# Patient Record
Sex: MALE | Race: WHITE | NOT HISPANIC OR LATINO
[De-identification: names, ages, dates, MRNs, and addresses within clinical notes are randomized per-mention and may not be internally consistent; named-entity substitution may affect disease eponyms.]

---

## 2018-12-11 PROBLEM — Z00.00 ENCOUNTER FOR PREVENTIVE HEALTH EXAMINATION: Status: ACTIVE | Noted: 2018-12-11

## 2018-12-13 ENCOUNTER — TRANSCRIPTION ENCOUNTER (OUTPATIENT)
Age: 38
End: 2018-12-13

## 2018-12-13 ENCOUNTER — APPOINTMENT (OUTPATIENT)
Dept: UROLOGY | Facility: CLINIC | Age: 38
End: 2018-12-13
Payer: COMMERCIAL

## 2018-12-13 ENCOUNTER — EMERGENCY (EMERGENCY)
Facility: HOSPITAL | Age: 38
LOS: 1 days | Discharge: HOME | End: 2018-12-13
Attending: EMERGENCY MEDICINE | Admitting: EMERGENCY MEDICINE
Payer: COMMERCIAL

## 2018-12-13 ENCOUNTER — INPATIENT (INPATIENT)
Facility: HOSPITAL | Age: 38
LOS: 6 days | Discharge: HOME | End: 2018-12-20
Attending: SPECIALIST | Admitting: SPECIALIST
Payer: COMMERCIAL

## 2018-12-13 VITALS — TEMPERATURE: 103 F

## 2018-12-13 VITALS
HEIGHT: 71.5 IN | SYSTOLIC BLOOD PRESSURE: 148 MMHG | WEIGHT: 306 LBS | TEMPERATURE: 99.5 F | OXYGEN SATURATION: 92 % | HEART RATE: 106 BPM | DIASTOLIC BLOOD PRESSURE: 81 MMHG | BODY MASS INDEX: 41.9 KG/M2

## 2018-12-13 VITALS
DIASTOLIC BLOOD PRESSURE: 76 MMHG | TEMPERATURE: 99 F | RESPIRATION RATE: 18 BRPM | SYSTOLIC BLOOD PRESSURE: 147 MMHG | OXYGEN SATURATION: 100 % | HEART RATE: 102 BPM

## 2018-12-13 VITALS
OXYGEN SATURATION: 97 % | WEIGHT: 302.92 LBS | HEART RATE: 92 BPM | SYSTOLIC BLOOD PRESSURE: 145 MMHG | TEMPERATURE: 99 F | DIASTOLIC BLOOD PRESSURE: 84 MMHG | RESPIRATION RATE: 20 BRPM | HEIGHT: 71 IN

## 2018-12-13 VITALS
SYSTOLIC BLOOD PRESSURE: 125 MMHG | HEART RATE: 112 BPM | OXYGEN SATURATION: 92 % | TEMPERATURE: 98.6 F | DIASTOLIC BLOOD PRESSURE: 82 MMHG

## 2018-12-13 DIAGNOSIS — N50.819 TESTICULAR PAIN, UNSPECIFIED: ICD-10-CM

## 2018-12-13 DIAGNOSIS — M54.9 DORSALGIA, UNSPECIFIED: ICD-10-CM

## 2018-12-13 DIAGNOSIS — Z98.890 OTHER SPECIFIED POSTPROCEDURAL STATES: Chronic | ICD-10-CM

## 2018-12-13 DIAGNOSIS — N45.3 EPIDIDYMO-ORCHITIS: ICD-10-CM

## 2018-12-13 DIAGNOSIS — N45.2 ORCHITIS: ICD-10-CM

## 2018-12-13 LAB
ALBUMIN SERPL ELPH-MCNC: 4.3 G/DL — SIGNIFICANT CHANGE UP (ref 3.3–5)
ALP SERPL-CCNC: 72 U/L — SIGNIFICANT CHANGE UP (ref 40–120)
ALT FLD-CCNC: 27 U/L — SIGNIFICANT CHANGE UP (ref 4–41)
APPEARANCE UR: CLEAR — SIGNIFICANT CHANGE UP
APTT BLD: 31.5 SEC — SIGNIFICANT CHANGE UP (ref 27.5–36.3)
AST SERPL-CCNC: 16 U/L — SIGNIFICANT CHANGE UP (ref 4–40)
BACTERIA # UR AUTO: NEGATIVE — SIGNIFICANT CHANGE UP
BASE EXCESS BLDV CALC-SCNC: 3.4 MMOL/L — SIGNIFICANT CHANGE UP
BASOPHILS # BLD AUTO: 0.07 K/UL — SIGNIFICANT CHANGE UP (ref 0–0.2)
BASOPHILS NFR BLD AUTO: 0.3 % — SIGNIFICANT CHANGE UP (ref 0–2)
BASOPHILS NFR SPEC: 0 % — SIGNIFICANT CHANGE UP (ref 0–2)
BILIRUB SERPL-MCNC: 0.3 MG/DL — SIGNIFICANT CHANGE UP (ref 0.2–1.2)
BILIRUB UR-MCNC: NEGATIVE — SIGNIFICANT CHANGE UP
BLASTS # FLD: 0 % — SIGNIFICANT CHANGE UP (ref 0–0)
BLOOD GAS VENOUS - CREATININE: 1.06 MG/DL — SIGNIFICANT CHANGE UP (ref 0.5–1.3)
BLOOD UR QL VISUAL: NEGATIVE — SIGNIFICANT CHANGE UP
BUN SERPL-MCNC: 11 MG/DL — SIGNIFICANT CHANGE UP (ref 7–23)
CALCIUM SERPL-MCNC: 9.2 MG/DL — SIGNIFICANT CHANGE UP (ref 8.4–10.5)
CHLORIDE BLDV-SCNC: 104 MMOL/L — SIGNIFICANT CHANGE UP (ref 96–108)
CHLORIDE SERPL-SCNC: 100 MMOL/L — SIGNIFICANT CHANGE UP (ref 98–107)
CO2 SERPL-SCNC: 26 MMOL/L — SIGNIFICANT CHANGE UP (ref 22–31)
COLOR SPEC: YELLOW — SIGNIFICANT CHANGE UP
CREAT SERPL-MCNC: 1.13 MG/DL — SIGNIFICANT CHANGE UP (ref 0.5–1.3)
EOSINOPHIL # BLD AUTO: 0.03 K/UL — SIGNIFICANT CHANGE UP (ref 0–0.5)
EOSINOPHIL NFR BLD AUTO: 0.1 % — SIGNIFICANT CHANGE UP (ref 0–6)
EOSINOPHIL NFR FLD: 0 % — SIGNIFICANT CHANGE UP (ref 0–6)
GAS PNL BLDV: 137 MMOL/L — SIGNIFICANT CHANGE UP (ref 136–146)
GLUCOSE BLDV-MCNC: 139 — HIGH (ref 70–99)
GLUCOSE SERPL-MCNC: 130 MG/DL — HIGH (ref 70–99)
GLUCOSE UR-MCNC: NEGATIVE — SIGNIFICANT CHANGE UP
HCO3 BLDV-SCNC: 26 MMOL/L — SIGNIFICANT CHANGE UP (ref 20–27)
HCT VFR BLD CALC: 45.9 % — SIGNIFICANT CHANGE UP (ref 39–50)
HCT VFR BLDV CALC: 47.2 % — SIGNIFICANT CHANGE UP (ref 39–51)
HGB BLD-MCNC: 15 G/DL — SIGNIFICANT CHANGE UP (ref 13–17)
HGB BLDV-MCNC: 15.4 G/DL — SIGNIFICANT CHANGE UP (ref 13–17)
HYALINE CASTS # UR AUTO: NEGATIVE — SIGNIFICANT CHANGE UP
IMM GRANULOCYTES # BLD AUTO: 0.16 # — SIGNIFICANT CHANGE UP
IMM GRANULOCYTES NFR BLD AUTO: 0.6 % — SIGNIFICANT CHANGE UP (ref 0–1.5)
INR BLD: 1.11 — SIGNIFICANT CHANGE UP (ref 0.88–1.17)
KETONES UR-MCNC: NEGATIVE — SIGNIFICANT CHANGE UP
LACTATE BLDV-MCNC: 1.5 MMOL/L — SIGNIFICANT CHANGE UP (ref 0.5–2)
LEUKOCYTE ESTERASE UR-ACNC: NEGATIVE — SIGNIFICANT CHANGE UP
LYMPHOCYTES # BLD AUTO: 1.55 K/UL — SIGNIFICANT CHANGE UP (ref 1–3.3)
LYMPHOCYTES # BLD AUTO: 5.9 % — LOW (ref 13–44)
LYMPHOCYTES NFR SPEC AUTO: 6.1 % — LOW (ref 13–44)
MANUAL SMEAR VERIFICATION: SIGNIFICANT CHANGE UP
MCHC RBC-ENTMCNC: 29.4 PG — SIGNIFICANT CHANGE UP (ref 27–34)
MCHC RBC-ENTMCNC: 32.7 % — SIGNIFICANT CHANGE UP (ref 32–36)
MCV RBC AUTO: 90 FL — SIGNIFICANT CHANGE UP (ref 80–100)
METAMYELOCYTES # FLD: 0 % — SIGNIFICANT CHANGE UP (ref 0–1)
MONOCYTES # BLD AUTO: 1.45 K/UL — HIGH (ref 0–0.9)
MONOCYTES NFR BLD AUTO: 5.6 % — SIGNIFICANT CHANGE UP (ref 2–14)
MONOCYTES NFR BLD: 3.5 % — SIGNIFICANT CHANGE UP (ref 2–9)
MORPHOLOGY BLD-IMP: NORMAL — SIGNIFICANT CHANGE UP
MYELOCYTES NFR BLD: 0 % — SIGNIFICANT CHANGE UP (ref 0–0)
NEUTROPHIL AB SER-ACNC: 85.2 % — HIGH (ref 43–77)
NEUTROPHILS # BLD AUTO: 22.8 K/UL — HIGH (ref 1.8–7.4)
NEUTROPHILS NFR BLD AUTO: 87.5 % — HIGH (ref 43–77)
NEUTS BAND # BLD: 5.2 % — SIGNIFICANT CHANGE UP (ref 0–6)
NITRITE UR-MCNC: NEGATIVE — SIGNIFICANT CHANGE UP
NRBC # FLD: 0 — SIGNIFICANT CHANGE UP
OTHER - HEMATOLOGY %: 0 — SIGNIFICANT CHANGE UP
PCO2 BLDV: 44 MMHG — SIGNIFICANT CHANGE UP (ref 41–51)
PH BLDV: 7.42 PH — SIGNIFICANT CHANGE UP (ref 7.32–7.43)
PH UR: 6 — SIGNIFICANT CHANGE UP (ref 5–8)
PLATELET # BLD AUTO: 356 K/UL — SIGNIFICANT CHANGE UP (ref 150–400)
PLATELET COUNT - ESTIMATE: NORMAL — SIGNIFICANT CHANGE UP
PMV BLD: 10 FL — SIGNIFICANT CHANGE UP (ref 7–13)
PO2 BLDV: 30 MMHG — LOW (ref 35–40)
POTASSIUM BLDV-SCNC: 3.9 MMOL/L — SIGNIFICANT CHANGE UP (ref 3.4–4.5)
POTASSIUM SERPL-MCNC: 4 MMOL/L — SIGNIFICANT CHANGE UP (ref 3.5–5.3)
POTASSIUM SERPL-SCNC: 4 MMOL/L — SIGNIFICANT CHANGE UP (ref 3.5–5.3)
PROMYELOCYTES # FLD: 0 % — SIGNIFICANT CHANGE UP (ref 0–0)
PROT SERPL-MCNC: 7.9 G/DL — SIGNIFICANT CHANGE UP (ref 6–8.3)
PROT UR-MCNC: 20 — SIGNIFICANT CHANGE UP
PROTHROM AB SERPL-ACNC: 12.7 SEC — SIGNIFICANT CHANGE UP (ref 9.8–13.1)
RBC # BLD: 5.1 M/UL — SIGNIFICANT CHANGE UP (ref 4.2–5.8)
RBC # FLD: 13 % — SIGNIFICANT CHANGE UP (ref 10.3–14.5)
RBC CASTS # UR COMP ASSIST: SIGNIFICANT CHANGE UP (ref 0–?)
REVIEW TO FOLLOW: YES — SIGNIFICANT CHANGE UP
SAO2 % BLDV: 55.9 % — LOW (ref 60–85)
SODIUM SERPL-SCNC: 137 MMOL/L — SIGNIFICANT CHANGE UP (ref 135–145)
SP GR SPEC: 1.03 — SIGNIFICANT CHANGE UP (ref 1–1.04)
SQUAMOUS # UR AUTO: SIGNIFICANT CHANGE UP
UROBILINOGEN FLD QL: NORMAL — SIGNIFICANT CHANGE UP
VARIANT LYMPHS # BLD: 0 % — SIGNIFICANT CHANGE UP
WBC # BLD: 26.06 K/UL — HIGH (ref 3.8–10.5)
WBC # FLD AUTO: 26.06 K/UL — HIGH (ref 3.8–10.5)
WBC UR QL: SIGNIFICANT CHANGE UP (ref 0–?)

## 2018-12-13 PROCEDURE — 99203 OFFICE O/P NEW LOW 30 MIN: CPT

## 2018-12-13 PROCEDURE — 99285 EMERGENCY DEPT VISIT HI MDM: CPT | Mod: 25

## 2018-12-13 PROCEDURE — 99223 1ST HOSP IP/OBS HIGH 75: CPT

## 2018-12-13 PROCEDURE — 76870 US EXAM SCROTUM: CPT | Mod: 26

## 2018-12-13 RX ORDER — SENNA PLUS 8.6 MG/1
2 TABLET ORAL AT BEDTIME
Qty: 0 | Refills: 0 | Status: DISCONTINUED | OUTPATIENT
Start: 2018-12-13 | End: 2018-12-20

## 2018-12-13 RX ORDER — CEFTRIAXONE 500 MG/1
250 INJECTION, POWDER, FOR SOLUTION INTRAMUSCULAR; INTRAVENOUS ONCE
Qty: 0 | Refills: 0 | Status: COMPLETED | OUTPATIENT
Start: 2018-12-13 | End: 2018-12-13

## 2018-12-13 RX ORDER — HEPARIN SODIUM 5000 [USP'U]/ML
5000 INJECTION INTRAVENOUS; SUBCUTANEOUS EVERY 12 HOURS
Qty: 0 | Refills: 0 | Status: DISCONTINUED | OUTPATIENT
Start: 2018-12-13 | End: 2018-12-20

## 2018-12-13 RX ORDER — ONDANSETRON 8 MG/1
4 TABLET, FILM COATED ORAL EVERY 6 HOURS
Qty: 0 | Refills: 0 | Status: DISCONTINUED | OUTPATIENT
Start: 2018-12-13 | End: 2018-12-20

## 2018-12-13 RX ORDER — CEFTRIAXONE 500 MG/1
1 INJECTION, POWDER, FOR SOLUTION INTRAMUSCULAR; INTRAVENOUS EVERY 24 HOURS
Qty: 0 | Refills: 0 | Status: DISCONTINUED | OUTPATIENT
Start: 2018-12-14 | End: 2018-12-14

## 2018-12-13 RX ORDER — OXYCODONE AND ACETAMINOPHEN 5; 325 MG/1; MG/1
1 TABLET ORAL ONCE
Qty: 0 | Refills: 0 | Status: DISCONTINUED | OUTPATIENT
Start: 2018-12-13 | End: 2018-12-13

## 2018-12-13 RX ORDER — MORPHINE SULFATE 50 MG/1
6 CAPSULE, EXTENDED RELEASE ORAL ONCE
Qty: 0 | Refills: 0 | Status: DISCONTINUED | OUTPATIENT
Start: 2018-12-13 | End: 2018-12-13

## 2018-12-13 RX ORDER — OXYCODONE HYDROCHLORIDE 5 MG/1
15 TABLET ORAL EVERY 6 HOURS
Qty: 0 | Refills: 0 | Status: DISCONTINUED | OUTPATIENT
Start: 2018-12-13 | End: 2018-12-19

## 2018-12-13 RX ORDER — SODIUM CHLORIDE 9 MG/ML
1000 INJECTION INTRAMUSCULAR; INTRAVENOUS; SUBCUTANEOUS ONCE
Qty: 0 | Refills: 0 | Status: COMPLETED | OUTPATIENT
Start: 2018-12-13 | End: 2018-12-13

## 2018-12-13 RX ORDER — MORPHINE SULFATE 50 MG/1
4 CAPSULE, EXTENDED RELEASE ORAL
Qty: 0 | Refills: 0 | Status: DISCONTINUED | OUTPATIENT
Start: 2018-12-13 | End: 2018-12-15

## 2018-12-13 RX ORDER — MORPHINE SULFATE 50 MG/1
4 CAPSULE, EXTENDED RELEASE ORAL ONCE
Qty: 0 | Refills: 0 | Status: DISCONTINUED | OUTPATIENT
Start: 2018-12-13 | End: 2018-12-13

## 2018-12-13 RX ORDER — SODIUM CHLORIDE 9 MG/ML
2000 INJECTION, SOLUTION INTRAVENOUS ONCE
Qty: 0 | Refills: 0 | Status: COMPLETED | OUTPATIENT
Start: 2018-12-13 | End: 2018-12-13

## 2018-12-13 RX ORDER — ACETAMINOPHEN 500 MG
1000 TABLET ORAL ONCE
Qty: 0 | Refills: 0 | Status: COMPLETED | OUTPATIENT
Start: 2018-12-14 | End: 2018-12-14

## 2018-12-13 RX ORDER — DOCUSATE SODIUM 100 MG
100 CAPSULE ORAL THREE TIMES A DAY
Qty: 0 | Refills: 0 | Status: DISCONTINUED | OUTPATIENT
Start: 2018-12-13 | End: 2018-12-20

## 2018-12-13 RX ORDER — ACETAMINOPHEN 500 MG
650 TABLET ORAL ONCE
Qty: 0 | Refills: 0 | Status: COMPLETED | OUTPATIENT
Start: 2018-12-13 | End: 2018-12-13

## 2018-12-13 RX ORDER — SODIUM CHLORIDE 9 MG/ML
1000 INJECTION, SOLUTION INTRAVENOUS
Qty: 0 | Refills: 0 | Status: DISCONTINUED | OUTPATIENT
Start: 2018-12-13 | End: 2018-12-17

## 2018-12-13 RX ORDER — ACETAMINOPHEN 500 MG
1000 TABLET ORAL ONCE
Qty: 0 | Refills: 0 | Status: COMPLETED | OUTPATIENT
Start: 2018-12-13 | End: 2018-12-13

## 2018-12-13 RX ORDER — ACETAMINOPHEN 500 MG
1000 TABLET ORAL ONCE
Qty: 0 | Refills: 0 | Status: COMPLETED | OUTPATIENT
Start: 2018-12-13 | End: 2018-12-14

## 2018-12-13 RX ADMIN — Medication 650 MILLIGRAM(S): at 08:47

## 2018-12-13 RX ADMIN — MORPHINE SULFATE 6 MILLIGRAM(S): 50 CAPSULE, EXTENDED RELEASE ORAL at 08:47

## 2018-12-13 RX ADMIN — OXYCODONE AND ACETAMINOPHEN 1 TABLET(S): 5; 325 TABLET ORAL at 08:46

## 2018-12-13 RX ADMIN — MORPHINE SULFATE 6 MILLIGRAM(S): 50 CAPSULE, EXTENDED RELEASE ORAL at 08:46

## 2018-12-13 RX ADMIN — SODIUM CHLORIDE 1000 MILLILITER(S): 9 INJECTION INTRAMUSCULAR; INTRAVENOUS; SUBCUTANEOUS at 08:46

## 2018-12-13 RX ADMIN — Medication 400 MILLIGRAM(S): at 15:01

## 2018-12-13 RX ADMIN — MORPHINE SULFATE 4 MILLIGRAM(S): 50 CAPSULE, EXTENDED RELEASE ORAL at 09:58

## 2018-12-13 RX ADMIN — Medication 1000 MILLIGRAM(S): at 15:35

## 2018-12-13 RX ADMIN — OXYCODONE HYDROCHLORIDE 15 MILLIGRAM(S): 5 TABLET ORAL at 15:35

## 2018-12-13 RX ADMIN — Medication 100 MILLIGRAM(S): at 10:05

## 2018-12-13 RX ADMIN — CEFTRIAXONE 250 MILLIGRAM(S): 500 INJECTION, POWDER, FOR SOLUTION INTRAMUSCULAR; INTRAVENOUS at 10:27

## 2018-12-13 RX ADMIN — Medication 650 MILLIGRAM(S): at 08:46

## 2018-12-13 RX ADMIN — Medication 100 MILLIGRAM(S): at 17:50

## 2018-12-13 RX ADMIN — OXYCODONE HYDROCHLORIDE 15 MILLIGRAM(S): 5 TABLET ORAL at 15:01

## 2018-12-13 RX ADMIN — HEPARIN SODIUM 5000 UNIT(S): 5000 INJECTION INTRAVENOUS; SUBCUTANEOUS at 17:50

## 2018-12-13 RX ADMIN — OXYCODONE AND ACETAMINOPHEN 1 TABLET(S): 5; 325 TABLET ORAL at 07:34

## 2018-12-13 NOTE — ED PROVIDER NOTE - ATTENDING CONTRIBUTION TO CARE
HPI: 39 yo M with no past medical history presents with R testicle pain and swelling since sitting on R testicle 8d ago and feeling sudden pain.  Swelling and tenderness were improving but tonight got worse. Has had no sexual activity or new trauma since but has masterbated once since incident/trauma. dull achy Pain radiates to back and across lower abd, no masses seen.  Initially had gross hematuria, since resolved.  Also complaining of nausea, no back pain. Has appointment with urology today but decided to come to ED for eval.  Denies h/o sti, dysuria, penile discharge, fever.  Pain does not improve with elevation or cryotherapy.  EXAM: Inguinal region without masses/hernia, scrotum with severe right testicular pain with palpation, left normal, penis without bleeding or discharge at meatus. +cremasteric reflex bilaterally.   MDM: concern for hydrocele vs varicocele vs fluid build up in scrotum from trauma leading to pain. Also consider STI. Will obtain US, UA, provide pain meds, ice pack and reassess. also consider torsion but unlikely since + cremasteric reflex and has been ongoing x 6 days.

## 2018-12-13 NOTE — ED ADULT NURSE REASSESSMENT NOTE - NS ED NURSE REASSESS COMMENT FT1
Patient cleared for discharge by provider.  D/C instructions and heplock removed by provider. Chalino YEE

## 2018-12-13 NOTE — ED ADULT TRIAGE NOTE - CHIEF COMPLAINT QUOTE
On Wednesday, patient sat on right testicle, he has noticed increased swelling and pain.  Patient has been experiencing nausea, chills, lower back/abdomen pain, right leg and groin pain.  Had hematuria on Wednesday but now resolved, denies urinary sx at this time.  Difficult to ambulate due to pain.

## 2018-12-13 NOTE — ED PROVIDER NOTE - OBJECTIVE STATEMENT
38M with no past medical history R testicle pain and swelling since sitting on R testicle 8d ago and feeling sudden pain.  Swelling and tenderness are getting worse.  Pain radiates to back.  Initially had gross hematuria, since resolved.  Also complaining of nausea, no back pain. Has appointment with urology today.  Denies h/o sti, dysuria, penile discharge, fever.  Pain does not improve with elevation or cryotherapy.

## 2018-12-13 NOTE — PATIENT PROFILE ADULT - STATED REASON FOR ADMISSION
"I was sent from the doctors office bc my white count was high. I had an injury to my right scrotum on last Wednesday"

## 2018-12-13 NOTE — ED PROVIDER NOTE - PROGRESS NOTE DETAILS
Patient febrile to ~102, will draw labs including cultures, give ivf. [facility].  No torsion.  In setting of fever, will give ceftriaxone and doxycycline, will send home to fu with his urology appointment.

## 2018-12-13 NOTE — ED PROVIDER NOTE - CARE PLAN
Principal Discharge DX:	Scrotal pain Principal Discharge DX:	Scrotal pain  Assessment and plan of treatment:	1) You were here for testicle pain.  You were found with epididymal orchitis of your left testicle.   2) Take your medicine as prescribed.   3) Follow up with your urology appointment today for further evaluation and to answer any questions you have.    4) Return to the emergency department if you experience worsening symptoms, pain, fever, chills, nausea, vomiting or other concerning symptoms.

## 2018-12-13 NOTE — ED PROVIDER NOTE - MEDICAL DECISION MAKING DETAILS
38M here with traumatic R testicle pain after sitting on it.  Concern for torsion vs. traumatic hydrocele/hematoma vs. rupture.  Testicle u/s, pain meds, ua, gc chlam.

## 2018-12-13 NOTE — H&P ADULT - HISTORY OF PRESENT ILLNESS
Pt relates he accidentally sat on his scrotum 1 week ago and has been having pain and progressive swelling in his R hemiscrotum since.    He has been taking Ibuprofen 1200 mg a day to help with pain and swelling.  He made an appointment with a Urologist for today.  However, he developed lower abdominal and back pain and went to [facility] ER for evaluation.  There he was found to have a fever of 102.7, Leukocytopenia (26k) and an US c/w epidimo-orchitis. In ER  blood /  urine cultures and GC Chlamydia  were sent.  He was given Ceftriaxone and Doxycyline.   Pt was d/c 'ed and went to his Urologist appointment with Dr. Tucker.  After evaluation he was sent to [facility] for direct admission.   Denies any N/V.

## 2018-12-13 NOTE — H&P ADULT - NSHPLABSRESULTS_GEN_ALL_CORE
15.0   26.06 )-----------( 356      ( 13 Dec 2018 08:50 )             45.9         137  |  100  |  11  ----------------------------<  130<H>  4.0   |  26  |  1.13    Ca    9.2      13 Dec 2018 08:50    TPro  7.9  /  Alb  4.3  /  TBili  0.3  /  DBili  x   /  AST  16  /  ALT  27  /  AlkPhos  72  12-    Urinalysis Basic - ( 13 Dec 2018 08:51 )    Color: YELLOW / Appearance: CLEAR / S.030 / pH: 6.0  Gluc: NEGATIVE / Ketone: NEGATIVE  / Bili: NEGATIVE / Urobili: NORMAL   Blood: NEGATIVE / Protein: 20 / Nitrite: NEGATIVE   Leuk Esterase: NEGATIVE / RBC: 0-2 / WBC 3-5   Sq Epi: OCC / Non Sq Epi: x / Bacteria: NEGATIVE

## 2018-12-13 NOTE — ED PROVIDER NOTE - NSFOLLOWUPINSTRUCTIONS_ED_ALL_ED_FT
1) You were here for testicle pain.  You were found with epididymal orchitis of your left testicle.   2) Take your medicine as prescribed.   3) Follow up with your urology appointment today for further evaluation and to answer any questions you have.    4) Return to the emergency department if you experience worsening symptoms, pain, fever, chills, nausea, vomiting or other concerning symptoms.

## 2018-12-13 NOTE — ED PROVIDER NOTE - PHYSICAL EXAMINATION
***GEN - well appearing; NAD   ***HEAD - NC/AT  ***EYES/NOSE - PEERL, EOMI, mucous membranes moist, no discharge   ***THROAT: Oral cavity and pharynx normal. No inflammation, swelling, exudate, or lesions.    ***NECK: supple, non-tender no lymphadenopathy  ***PULMONARY - CTA b/l, symmetric breath sounds.   ***CARDIAC- s1s2, RRR, no murmur  ***ABDOMEN - +BS, ND, NT, soft, no guarding, no rebound, no organomegaly  ***BACK - no CVA tenderness, Normal  spine, no spinal TTP  ***EXTREMITIES - symmetric pulses, 2+ dp, capillary refill < 2 seconds, no clubbing, no cyanosis, no edema   ***SKIN - warm, dry, intact, no rash or bruising   ***NEUROLOGIC - a&o x3, CN 3-12 intact, sensation nl, motor 5/5 RUE/LUE/RLE/LLE  *** - R testicle hard, uniformly swollen, with diffuse tenderness to palpation, cremasteric absent on R, present on L.  Normal color and lie.  No penile discharge, no lesions.

## 2018-12-13 NOTE — ED ADULT NURSE NOTE - OBJECTIVE STATEMENT
PT brought into room 5. A&OX4 ambulatory self care male presents to the ED today after sitting on his testicles 12/5. Over time patient states the right one has become swollen and extremely painful." PT states the pain radiates to his abdomin and back. As well as a fever with chills and nausea. MD at bedside, pain worsening when touching, patient has bilateral sensation down his legs. Denies STD/ STI. Pt states he has not had sexual intercourse since event due to pain. Awaiting further orders.

## 2018-12-13 NOTE — ED ADULT NURSE REASSESSMENT NOTE - NS ED NURSE REASSESS COMMENT FT1
Received patient report at 08:30 a.m., patient labs drawn this A.M., with pain medication ordered by provider and administered.  Patint family at the bedside, plan is for sono and transported at this time for testing.  Chalino YEE

## 2018-12-13 NOTE — H&P ADULT - NSHPPHYSICALEXAM_GEN_ALL_CORE
PHYSICAL EXAM:      Constitutional: Awake alert NAD    Eyes: Conjunctiva clear    ENMT: Mouth Moist; No nasal discharge    Neck: supple , No JVD    Breasts: not examined    Back:  CVAT  [  ]Y   [ x ]N    Respiratory: No Resp Distress    Cardiovascular: Reg Rate and Rhythm    Gastrointestinal: Soft, NT, ND, No guarding    Genitourinary: Penis WNL ,  Scrotum + erythema, swelling, and tenderness and swelling of R hemiscrotum and R testicle and epididymis    Extremities: AROM x 4    Vascular: Radial Pulse Reg Rate & Rhythm    Neurological: No focal Defecits, A &O x3    Skin: No rash    Lymph Nodes: No Lymphadenopathy    Musculoskeletal: Strength Intact    Psychiatric: Cooperative, No  Psychosis No Agression

## 2018-12-13 NOTE — ED ADULT NURSE NOTE - NSIMPLEMENTINTERV_GEN_ALL_ED
Implemented All Universal Safety Interventions:  [place] to call system. Call bell, personal items and telephone within reach. Instruct patient to call for assistance. Room bathroom lighting operational. Non-slip footwear when patient is off stretcher. Physically safe environment: no spills, clutter or unnecessary equipment. Stretcher in lowest position, wheels locked, appropriate side rails in place.

## 2018-12-14 DIAGNOSIS — A41.9 SEPSIS, UNSPECIFIED ORGANISM: ICD-10-CM

## 2018-12-14 LAB
BACTERIA UR CULT: SIGNIFICANT CHANGE UP
BASOPHILS # BLD AUTO: 0.11 K/UL — SIGNIFICANT CHANGE UP (ref 0–0.2)
BASOPHILS NFR BLD AUTO: 0.3 % — SIGNIFICANT CHANGE UP (ref 0–2)
BUN SERPL-MCNC: 10 MG/DL — SIGNIFICANT CHANGE UP (ref 7–23)
C TRACH RRNA SPEC QL NAA+PROBE: SIGNIFICANT CHANGE UP
CALCIUM SERPL-MCNC: 9 MG/DL — SIGNIFICANT CHANGE UP (ref 8.4–10.5)
CHLORIDE SERPL-SCNC: 95 MMOL/L — LOW (ref 98–107)
CO2 SERPL-SCNC: 21 MMOL/L — LOW (ref 22–31)
CREAT SERPL-MCNC: 1 MG/DL — SIGNIFICANT CHANGE UP (ref 0.5–1.3)
EOSINOPHIL # BLD AUTO: 0.03 K/UL — SIGNIFICANT CHANGE UP (ref 0–0.5)
EOSINOPHIL NFR BLD AUTO: 0.1 % — SIGNIFICANT CHANGE UP (ref 0–6)
GLUCOSE SERPL-MCNC: 105 MG/DL — HIGH (ref 70–99)
HCT VFR BLD CALC: 43.3 % — SIGNIFICANT CHANGE UP (ref 39–50)
HGB BLD-MCNC: 14 G/DL — SIGNIFICANT CHANGE UP (ref 13–17)
IMM GRANULOCYTES # BLD AUTO: 0.54 # — SIGNIFICANT CHANGE UP
IMM GRANULOCYTES NFR BLD AUTO: 1.3 % — SIGNIFICANT CHANGE UP (ref 0–1.5)
LYMPHOCYTES # BLD AUTO: 2.89 K/UL — SIGNIFICANT CHANGE UP (ref 1–3.3)
LYMPHOCYTES # BLD AUTO: 7.1 % — LOW (ref 13–44)
MANUAL SMEAR VERIFICATION: SIGNIFICANT CHANGE UP
MCHC RBC-ENTMCNC: 29.3 PG — SIGNIFICANT CHANGE UP (ref 27–34)
MCHC RBC-ENTMCNC: 32.3 % — SIGNIFICANT CHANGE UP (ref 32–36)
MCV RBC AUTO: 90.6 FL — SIGNIFICANT CHANGE UP (ref 80–100)
MONOCYTES # BLD AUTO: 3.11 K/UL — HIGH (ref 0–0.9)
MONOCYTES NFR BLD AUTO: 7.6 % — SIGNIFICANT CHANGE UP (ref 2–14)
N GONORRHOEA RRNA SPEC QL NAA+PROBE: SIGNIFICANT CHANGE UP
NEUTROPHILS # BLD AUTO: 34.12 K/UL — HIGH (ref 1.8–7.4)
NEUTROPHILS NFR BLD AUTO: 83.6 % — HIGH (ref 43–77)
NRBC # FLD: 0 — SIGNIFICANT CHANGE UP
PLATELET # BLD AUTO: 344 K/UL — SIGNIFICANT CHANGE UP (ref 150–400)
PMV BLD: 10.5 FL — SIGNIFICANT CHANGE UP (ref 7–13)
POTASSIUM SERPL-MCNC: 3.7 MMOL/L — SIGNIFICANT CHANGE UP (ref 3.5–5.3)
POTASSIUM SERPL-SCNC: 3.7 MMOL/L — SIGNIFICANT CHANGE UP (ref 3.5–5.3)
RBC # BLD: 4.78 M/UL — SIGNIFICANT CHANGE UP (ref 4.2–5.8)
RBC # FLD: 13.2 % — SIGNIFICANT CHANGE UP (ref 10.3–14.5)
SODIUM SERPL-SCNC: 133 MMOL/L — LOW (ref 135–145)
SPECIMEN SOURCE: SIGNIFICANT CHANGE UP
WBC # BLD: 40.8 K/UL — CRITICAL HIGH (ref 3.8–10.5)
WBC # FLD AUTO: 40.8 K/UL — CRITICAL HIGH (ref 3.8–10.5)

## 2018-12-14 PROCEDURE — 99222 1ST HOSP IP/OBS MODERATE 55: CPT

## 2018-12-14 PROCEDURE — 99232 SBSQ HOSP IP/OBS MODERATE 35: CPT

## 2018-12-14 PROCEDURE — 99223 1ST HOSP IP/OBS HIGH 75: CPT

## 2018-12-14 RX ORDER — PIPERACILLIN AND TAZOBACTAM 4; .5 G/20ML; G/20ML
3.38 INJECTION, POWDER, LYOPHILIZED, FOR SOLUTION INTRAVENOUS EVERY 8 HOURS
Qty: 0 | Refills: 0 | Status: DISCONTINUED | OUTPATIENT
Start: 2018-12-14 | End: 2018-12-20

## 2018-12-14 RX ORDER — VANCOMYCIN HCL 1 G
1250 VIAL (EA) INTRAVENOUS EVERY 12 HOURS
Qty: 0 | Refills: 0 | Status: DISCONTINUED | OUTPATIENT
Start: 2018-12-15 | End: 2018-12-16

## 2018-12-14 RX ORDER — VANCOMYCIN HCL 1 G
1250 VIAL (EA) INTRAVENOUS ONCE
Qty: 0 | Refills: 0 | Status: COMPLETED | OUTPATIENT
Start: 2018-12-14 | End: 2018-12-14

## 2018-12-14 RX ORDER — VANCOMYCIN HCL 1 G
VIAL (EA) INTRAVENOUS
Qty: 0 | Refills: 0 | Status: DISCONTINUED | OUTPATIENT
Start: 2018-12-14 | End: 2018-12-16

## 2018-12-14 RX ORDER — PIPERACILLIN AND TAZOBACTAM 4; .5 G/20ML; G/20ML
3.38 INJECTION, POWDER, LYOPHILIZED, FOR SOLUTION INTRAVENOUS ONCE
Qty: 0 | Refills: 0 | Status: COMPLETED | OUTPATIENT
Start: 2018-12-14 | End: 2018-12-14

## 2018-12-14 RX ORDER — ACETAMINOPHEN 500 MG
1000 TABLET ORAL ONCE
Qty: 0 | Refills: 0 | Status: COMPLETED | OUTPATIENT
Start: 2018-12-14 | End: 2018-12-14

## 2018-12-14 RX ORDER — IBUPROFEN 200 MG
400 TABLET ORAL ONCE
Qty: 0 | Refills: 0 | Status: COMPLETED | OUTPATIENT
Start: 2018-12-14 | End: 2018-12-14

## 2018-12-14 RX ADMIN — PIPERACILLIN AND TAZOBACTAM 25 GRAM(S): 4; .5 INJECTION, POWDER, LYOPHILIZED, FOR SOLUTION INTRAVENOUS at 22:19

## 2018-12-14 RX ADMIN — MORPHINE SULFATE 4 MILLIGRAM(S): 50 CAPSULE, EXTENDED RELEASE ORAL at 12:50

## 2018-12-14 RX ADMIN — MORPHINE SULFATE 4 MILLIGRAM(S): 50 CAPSULE, EXTENDED RELEASE ORAL at 05:51

## 2018-12-14 RX ADMIN — Medication 400 MILLIGRAM(S): at 00:43

## 2018-12-14 RX ADMIN — Medication 100 MILLIGRAM(S): at 12:50

## 2018-12-14 RX ADMIN — Medication 100 MILLIGRAM(S): at 05:37

## 2018-12-14 RX ADMIN — Medication 100 MILLIGRAM(S): at 05:36

## 2018-12-14 RX ADMIN — Medication 100 MILLIGRAM(S): at 22:19

## 2018-12-14 RX ADMIN — SODIUM CHLORIDE 50 MILLILITER(S): 9 INJECTION, SOLUTION INTRAVENOUS at 05:36

## 2018-12-14 RX ADMIN — Medication 400 MILLIGRAM(S): at 05:36

## 2018-12-14 RX ADMIN — Medication 400 MILLIGRAM(S): at 12:50

## 2018-12-14 RX ADMIN — SENNA PLUS 2 TABLET(S): 8.6 TABLET ORAL at 22:19

## 2018-12-14 RX ADMIN — HEPARIN SODIUM 5000 UNIT(S): 5000 INJECTION INTRAVENOUS; SUBCUTANEOUS at 05:36

## 2018-12-14 RX ADMIN — SODIUM CHLORIDE 50 MILLILITER(S): 9 INJECTION, SOLUTION INTRAVENOUS at 00:43

## 2018-12-14 RX ADMIN — HEPARIN SODIUM 5000 UNIT(S): 5000 INJECTION INTRAVENOUS; SUBCUTANEOUS at 18:02

## 2018-12-14 RX ADMIN — CEFTRIAXONE 100 GRAM(S): 500 INJECTION, POWDER, FOR SOLUTION INTRAMUSCULAR; INTRAVENOUS at 01:50

## 2018-12-14 RX ADMIN — SODIUM CHLORIDE 50 MILLILITER(S): 9 INJECTION, SOLUTION INTRAVENOUS at 22:18

## 2018-12-14 RX ADMIN — Medication 166.67 MILLIGRAM(S): at 18:02

## 2018-12-14 RX ADMIN — Medication 400 MILLIGRAM(S): at 19:00

## 2018-12-14 RX ADMIN — PIPERACILLIN AND TAZOBACTAM 200 GRAM(S): 4; .5 INJECTION, POWDER, LYOPHILIZED, FOR SOLUTION INTRAVENOUS at 15:01

## 2018-12-14 RX ADMIN — Medication 400 MILLIGRAM(S): at 22:55

## 2018-12-14 RX ADMIN — OXYCODONE HYDROCHLORIDE 15 MILLIGRAM(S): 5 TABLET ORAL at 17:30

## 2018-12-14 RX ADMIN — MORPHINE SULFATE 4 MILLIGRAM(S): 50 CAPSULE, EXTENDED RELEASE ORAL at 13:05

## 2018-12-14 RX ADMIN — MORPHINE SULFATE 4 MILLIGRAM(S): 50 CAPSULE, EXTENDED RELEASE ORAL at 05:36

## 2018-12-14 RX ADMIN — OXYCODONE HYDROCHLORIDE 15 MILLIGRAM(S): 5 TABLET ORAL at 16:46

## 2018-12-14 RX ADMIN — MORPHINE SULFATE 4 MILLIGRAM(S): 50 CAPSULE, EXTENDED RELEASE ORAL at 01:02

## 2018-12-14 RX ADMIN — MORPHINE SULFATE 4 MILLIGRAM(S): 50 CAPSULE, EXTENDED RELEASE ORAL at 00:47

## 2018-12-14 NOTE — PROVIDER CONTACT NOTE (CRITICAL VALUE NOTIFICATION) - ASSESSMENT
Pt's recent vitals are t 98/3, HR 95, /68, RR 18 on 97% RA. Pt spiked a temperature 12/13 at 2300 of 100.9. Urology PA Nina Cervantes aware. IV tylenol was given and pt came down to 98. Denies SOB or chest pain. Pain is being managed w/ ordered pain meds.

## 2018-12-14 NOTE — CONSULT NOTE ADULT - PROBLEM SELECTOR RECOMMENDATION 2
Fever, leukocytosis, tachycardic on presentation.  UA neg, CXR neg, GC/CT neg.   f/u Ucx, Bcx  c/w IVF.  given neg GC/CT, would broaden coverage with Aztreonam in setting of unclear PCN allergy.   ID consult, f/u recs

## 2018-12-14 NOTE — PROGRESS NOTE ADULT - SUBJECTIVE AND OBJECTIVE BOX
Overnight events:  Remained afebrile overnight, PVR 30    Subjective:  Pt feels a little better, no voiding difficulties    Objective:    Vital signs  T(C): , Max: 39.3 (12-13-18 @ 08:15)  HR: 95 (12-14-18 @ 05:30)  BP: 149/68 (12-14-18 @ 05:30)  SpO2: 97% (12-14-18 @ 05:30)  Wt(kg): --    Output   Void: 400 + not saved, PVR 30    Gen: NAD  Abd: soft, nontender  : erythema/edema of penile shaft/scrotum, no crepitus, b/l testicles/epididymi tender, swollen right greater than left    Labs                        14.0   40.80 )-----------( 344      ( 14 Dec 2018 05:50 )             43.3     14 Dec 2018 05:50    133    |  95     |  10     ----------------------------<  105    3.7     |  21     |  1.00     Ca    9.0        14 Dec 2018 05:50        Urine Cx: P    Blood Cx: P    GC/chlamydia negative    Imaging:  Ultrasound:  Bilateral epididymoorchitis.

## 2018-12-14 NOTE — CONSULT NOTE ADULT - ATTENDING COMMENTS
Noreen Chavez MD  Pager: [phone]  After 5 PM or weekends please call fellow on call or office [phone]

## 2018-12-14 NOTE — CONSULT NOTE ADULT - PROBLEM SELECTOR RECOMMENDATION 9
US+, UA neg, all cx neg as now  Abx per ID rec.   given worsening leukocytosis with bandemia, would broaden GN coverage.

## 2018-12-14 NOTE — CONSULT NOTE ADULT - SUBJECTIVE AND OBJECTIVE BOX
Patient is a 38y old  Male who presents with a chief complaint of Epididimo-orchitis (14 Dec 2018 12:11)      HPI:  38M with no PMHx presented with worsening testicular pain and swelling x 1 week. Patient reports he had "sat on testicle" on Wednesday, resulted in acute pain at the time, which settled by Thursday. On Friday, patient noticed pain returning and worsening since. Patient initially had appointment with outpatient urologist but came to ED due to worsening pain and swelling. Patient was discharged from ED after given IV abx, but send back form urology office for direct admission due to sever symptoms. Patient reports the swelling and pain had been progressive worsening over the past few days. Denied any fever, chills, nausea, vomiting, or abdominal pain at home. Patient found to have fever, wbc and tachycardic in ED. UA neg, US scrotum showing b/l epididymoorchitis. Patient admit to urology service for management. started on Ceftriaxone/doxy. GC/CT neg.  scrotal pain, now stable with IV morphine.     Allergies    penicillin (Unknown reactions)      HOME MEDICATIONS: [ ] Reviewed  Home Medications:      MEDICATIONS  (STANDING):  cefTRIAXone   IVPB 1 Gram(s) IV Intermittent every 24 hours  docusate sodium 100 milliGRAM(s) Oral three times a day  doxycycline hyclate Capsule 100 milliGRAM(s) Oral every 12 hours  heparin  Injectable 5000 Unit(s) SubCutaneous every 12 hours  lactated ringers. 1000 milliLiter(s) (50 mL/Hr) IV Continuous <Continuous>  senna 2 Tablet(s) Oral at bedtime    MEDICATIONS  (PRN):  morphine  - Injectable 4 milliGRAM(s) IV Push every 3 hours PRN Severe Pain (7 - 10)  ondansetron Injectable 4 milliGRAM(s) IV Push every 6 hours PRN Nausea and/or Vomiting  oxyCODONE    IR 15 milliGRAM(s) Oral every 6 hours PRN Moderate Pain (4 - 6)      PAST MEDICAL & SURGICAL HISTORY:  No pertinent past medical history  H/O arthroscopic knee surgery: in high school    SOCIAL HISTORY:  Residence: [ ] [facility]  [ ] [facility]  [ ] Community  denied tobacco   denied etoh  denied illicit drug use     FAMILY HISTORY:  No pertinent family history in first degree relatives  [ ] No pertinent family history in first degree relatives     REVIEW OF SYSTEMS:    CONSTITUTIONAL: No fever; No chills; No night sweats; No weight loss; No fatigue  EYES: No eye pain; No blurry vision  ENMT:  No difficulty hearing; No sinus or throat pain  NECK: No pain or stiffness  RESPIRATORY: No cough; No wheezing; No hemoptysis; No shortness of breath; No sputum production  CARDIOVASCULAR: No chest pain; No palpitations; No leg swelling  GASTROINTESTINAL: No abdominal; No nausea; No vomiting; No hematemesis; No diarrhea; No constipation. No melena or hematochezia.  GENITOURINARY: +scrotal pain and swelling; No dysuria; No frequency; No hematuria; No incontinence  NEUROLOGICAL: No headaches; No loss of strength; No numbness  SKIN: No itching/burning; No rashes or lesions   MUSCULOSKELETAL: No joint pain or swelling; No muscle, back, or extremity pain  HEME/LYMPH: No easy bruising, or bleeding gums     Vital Signs Last 24 Hrs  T(C): 38.8 (14 Dec 2018 12:45), Max: 38.8 (14 Dec 2018 12:45)  T(F): 101.9 (14 Dec 2018 12:45), Max: 101.9 (14 Dec 2018 12:45)  HR: 97 (14 Dec 2018 12:45) (81 - 97)  BP: 143/66 (14 Dec 2018 12:45) (135/74 - 149/68)  BP(mean): --  RR: 17 (14 Dec 2018 12:45) (17 - 18)  SpO2: 96% (14 Dec 2018 12:45) (93% - 97%)    PHYSICAL EXAM:    GENERAL: no apparent distress  HEAD:  Atraumatic, Normocephalic  EYES: EOMI, PERRLA, conjunctiva and sclera clear b/l  CHEST/LUNG: on RA; Clear to auscultation bilaterally; No wheezing; No crackles  HEART: Regular rate and rhythm; S1/S2 wnl; no obvious murmurs  ABDOMEN: Soft, Nontender, Nondistended; Bowel sounds present  : localized scrotal edema with erythema. tender to palpation.   EXTREMITIES:  2+ Peripheral Pulses, No edema  PSYCH: normal affect, calm demeanor  NEUROLOGY: AAOX3, CN 2-12 grossly intact, no obvious FND     LABS:                        14.0   40.80 )-----------( 344      ( 14 Dec 2018 05:50 )             43.3     12-    133<L>  |  95<L>  |  10  ----------------------------<  105<H>  3.7   |  21<L>  |  1.00    Ca    9.0      14 Dec 2018 05:50    TPro  7.9  /  Alb  4.3  /  TBili  0.3  /  DBili  x   /  AST  16  /  ALT  27  /  AlkPhos  72  12-13    PT/INR - ( 13 Dec 2018 08:50 )   PT: 12.7 SEC;   INR: 1.11          PTT - ( 13 Dec 2018 08:50 )  PTT:31.5 SEC  Urinalysis Basic - ( 13 Dec 2018 08:51 )    Color: YELLOW / Appearance: CLEAR / S.030 / pH: 6.0  Gluc: NEGATIVE / Ketone: NEGATIVE  / Bili: NEGATIVE / Urobili: NORMAL   Blood: NEGATIVE / Protein: 20 / Nitrite: NEGATIVE   Leuk Esterase: NEGATIVE / RBC: 0-2 / WBC 3-5   Sq Epi: OCC / Non Sq Epi: x / Bacteria: NEGATIVE      CAPILLARY BLOOD GLUCOSE          RADIOLOGY & ADDITIONAL STUDIES:    EKG:   Personally Reviewed:  [ ] YES     Imaging:   Personally Reviewed:  [ ] YES               Consultant(s) notes reviewed:  Urology  Care Discussed with Consultant(s)/Other Providers:

## 2018-12-14 NOTE — CONSULT NOTE ADULT - SUBJECTIVE AND OBJECTIVE BOX
HPI:    38 year old male with no PMH who presents with right scrotal swelling and pain. Patient notes that he accidently sat on his scrotum ~1 week prior to admission and has been having pain and progressive swelling in his R hemiscrotum since.  He has been taking Ibuprofen 1200 mg a day to help with pain and swelling. He made an appointment with a Urologist on day of admission but developed lower abdominal and back pain and went to [facility] for evaluation.    On presentation febrile to 100.9, tachycardic to > 90 but normotensive. WBC on day of presentation of 26.06 with 87.5% PMN and 5.2% bands. U/A with 3-5 WBC. Urine GC/Chlam negative. US of testes with bilateral epididymoorchitis. Started on Ceftriaxone and Doxycycline. WBC on day after admission of 40.8.     PAST MEDICAL & SURGICAL HISTORY:  No pertinent past medical history  H/O arthroscopic knee surgery: in high school      Allergies  penicillin (Unknown)        ANTIMICROBIALS:  cefTRIAXone   IVPB 1 every 24 hours  doxycycline hyclate Capsule 100 every 12 hours      OTHER MEDS: MEDICATIONS  (STANDING):  acetaminophen  IVPB .. 1000 once  docusate sodium 100 three times a day  heparin  Injectable 5000 every 12 hours  morphine  - Injectable 4 every 3 hours PRN  ondansetron Injectable 4 every 6 hours PRN  oxyCODONE    IR 15 every 6 hours PRN  senna 2 at bedtime      SOCIAL HISTORY:  [ ] etoh [ ] tobacco [ ] former smoker [ ] IVDU    FAMILY HISTORY:  No pertinent family history in first degree relatives      REVIEW OF SYSTEMS  [  ] ROS unobtainable because:    [  ] All other systems negative except as noted below:	    Constitutional:  [ ] fever [ ] chills  [ ] weight loss  [ ] weakness  Skin:  [ ] rash [ ] phlebitis	  Eyes: [ ] icterus [ ] pain  [ ] discharge	  ENMT: [ ] sore throat  [ ] thrush [ ] ulcers [ ] exudates  Respiratory: [ ] dyspnea [ ] hemoptysis [ ] cough [ ] sputum	  Cardiovascular:  [ ] chest pain [ ] palpitations [ ] edema	  Gastrointestinal:  [ ] nausea [ ] vomiting [ ] diarrhea [ ] constipation [ ] pain	  Genitourinary:  [ ] dysuria [ ] frequency [ ] hematuria [ ] discharge [ ] flank pain  [ ] incontinence  Musculoskeletal:  [ ] myalgias [ ] arthralgias [ ] arthritis  [ ] back pain  Neurological:  [ ] headache [ ] seizures  [ ] confusion/altered mental status  Psychiatric:  [ ] anxiety [ ] depression	  Hematology/Lymphatics:  [ ] lymphadenopathy  Endocrine:  [ ] adrenal [ ] thyroid  Allergic/Immunologic:	 [ ] transplant [ ] seasonal    Vital Signs Last 24 Hrs  T(F): 99.3 (18 @ 09:27), Max: 102.7 (18 @ 08:15)    Vital Signs Last 24 Hrs  HR: 91 (18 @ 09:27) (81 - 96)  BP: 136/77 (18 @ 09:27) (135/74 - 149/68)  RR: 18 (18 @ 09:27)  SpO2: 97% (18 @ 09:27) (93% - 97%)  Wt(kg): --    PHYSICAL EXAM:  General: non-toxic  HEAD/EYES: anicteric, PERRL  ENT:  supple  Cardiovascular:   S1, S2  Respiratory:  clear bilaterally  GI:  soft, non-tender, normal bowel sounds  :  no CVA tenderness   Musculoskeletal:  no synovitis  Neurologic:  grossly non-focal  Skin:  no rash  Lymph: no lymphadenopathy  Psychiatric:  appropriate affect  Vascular:  no phlebitis                                14.0   40.80 )-----------( 344      ( 14 Dec 2018 05:50 )             43.3       12-    133<L>  |  95<L>  |  10  ----------------------------<  105<H>  3.7   |  21<L>  |  1.00    Ca    9.0      14 Dec 2018 05:50    TPro  7.9  /  Alb  4.3  /  TBili  0.3  /  DBili  x   /  AST  16  /  ALT  27  /  AlkPhos  72  12-      Urinalysis Basic - ( 13 Dec 2018 08:51 )    Color: YELLOW / Appearance: CLEAR / S.030 / pH: 6.0  Gluc: NEGATIVE / Ketone: NEGATIVE  / Bili: NEGATIVE / Urobili: NORMAL   Blood: NEGATIVE / Protein: 20 / Nitrite: NEGATIVE   Leuk Esterase: NEGATIVE / RBC: 0-2 / WBC 3-5   Sq Epi: OCC / Non Sq Epi: x / Bacteria: NEGATIVE        MICROBIOLOGY:  URINE MIDSTREAM  18 --  --  --    RADIOLOGY:    EXAM:  US SCROTUM AND CONTENTS    PROCEDURE DATE:  Dec 13 2018   Bilateral epididymoorchitis. HPI:    38 year old male with no PMH who presents with bilateral scrotal swelling and pain. Patient notes that he accidently sat on his scrotum ~1 week prior to admission and has been having pain and progressive swelling in his R hemiscrotum since.  He has been taking Ibuprofen 1200 mg a day to help with pain and swelling. He made an appointment with a Urologist on day of admission but developed lower abdominal and back pain and went to [facility] ER for evaluation on 18. WBC on day of presentation of 26.06 with 87.5% PMN and 5.2% bands. U/A with 3-5 WBC. Urine GC/Chlam negative. US of testes with bilateral epididymoorchitis. Patient recommended to followup as outpatient with urology. Saw urology on same day but given labwork was instead directly admitted.  On presentation febrile to 100.9, tachycardic to > 90 but normotensive. Started on Ceftriaxone and Doxycycline. WBC on day after admission of 40.8. Febrile today to 101.9. Patient notes worsening testicular swelling today and pain. Notes some suprapubic pain which is new. Notes a band like back pain bilaterally. Denies dysuria or urinary frequency. Denies any sexual partners outside of his marriage. Denies any history of STD/STI. Does note UTI ~1-2 months ago which was treated with Ciprofloxacin - notes dysuria as presenting symptom at that point which resolved with antibiotics. Denies UTI prior to that episode.     PAST MEDICAL & SURGICAL HISTORY:  No pertinent past medical history  H/O arthroscopic knee surgery: in high school      Allergies  penicillin (Unknown)        ANTIMICROBIALS:  cefTRIAXone   IVPB 1 every 24 hours  doxycycline hyclate Capsule 100 every 12 hours      OTHER MEDS: MEDICATIONS  (STANDING):  acetaminophen  IVPB .. 1000 once  docusate sodium 100 three times a day  heparin  Injectable 5000 every 12 hours  morphine  - Injectable 4 every 3 hours PRN  ondansetron Injectable 4 every 6 hours PRN  oxyCODONE    IR 15 every 6 hours PRN  senna 2 at bedtime      SOCIAL HISTORY:  0.5 PPD for 10-15 years. Social EtOH use. No recreational drug use. No recent travel. Monogamous with his wife. UTD with his childhood vaccines. His two children are UTD with their childhood vaccines     FAMILY HISTORY:  No pertinent family history in first degree relatives      REVIEW OF SYSTEMS  [  ] ROS unobtainable because:    [ x ] All other systems negative except as noted below:	    Constitutional:  [x ] fever [x ] chills  [ ] weight loss  [ ] weakness  Skin:  [ ] rash [ ] phlebitis	  Eyes: [ ] icterus [ ] pain  [ ] discharge	  ENMT: [ ] sore throat  [ ] thrush [ ] ulcers [ ] exudates  Respiratory: [ ] dyspnea [ ] hemoptysis [ ] cough [ ] sputum	  Cardiovascular:  [ ] chest pain [ ] palpitations [ ] edema	  Gastrointestinal:  [ ] nausea [ ] vomiting [ ] diarrhea [ ] constipation [ ] pain	  Genitourinary:  [ ] dysuria [ ] frequency [ ] hematuria [ ] discharge [ ] flank pain  [ ] incontinence +testicular pain and swelling  Musculoskeletal:  [ ] myalgias [ ] arthralgias [ ] arthritis  [ ] back pain  Neurological:  [ ] headache [ ] seizures  [ ] confusion/altered mental status  Psychiatric:  [ ] anxiety [ ] depression	  Hematology/Lymphatics:  [ ] lymphadenopathy  Endocrine:  [ ] adrenal [ ] thyroid  Allergic/Immunologic:	 [ ] transplant [ ] seasonal    Vital Signs Last 24 Hrs  T(F): 99.3 (18 @ 09:27), Max: 102.7 (18 @ 08:15)    Vital Signs Last 24 Hrs  HR: 91 (18 @ 09:27) (81 - 96)  BP: 136/77 (18 @ 09:27) (135/74 - 149/68)  RR: 18 (18 @ 09:27)  SpO2: 97% (18 @ 09:27) (93% - 97%)  Wt(kg): --    PHYSICAL EXAMINATION:  General: Alert and Awake, NAD  HEENT: PERRL, EOMI, No subconjunctival hemorrhages, Oropharynx Clear, MMM  Neck: Supple, No SUNIL  Cardiac: RRR, No M/R/G  Resp: CTAB, No Wh/Rh/Ra  Abdomen: Mild suprapubic tenderness to palpation but no erythema over this area, NBS, No HSM, No rigidity or guarding  MSK: No LE edema. No stigmata of IE. No evidence of phlebitis. No evidence of synovitis.  Back: No CVA Tenderness  : No mitchell, +Faint erythema of the penis and the scrotum with edema of both the penis and the scrotum. No laceration/abrasion of the scrotum was visualized. The testes are enlarged, firm to palpation and tender bilaterally.  Skin: Findings per  section - otherwise no other rashes or lesions. Skin is warm and dry to the touch.   Neuro: Alert and Awake. CN 2-12 Grossly intact. Moves all four extremities spontaneously.  Psych: Calm, Pleasant, Cooperative                          14.0   40.80 )-----------( 344      ( 14 Dec 2018 05:50 )             43.3       12-14    133<L>  |  95<L>  |  10  ----------------------------<  105<H>  3.7   |  21<L>  |  1.00    Ca    9.0      14 Dec 2018 05:50    TPro  7.9  /  Alb  4.3  /  TBili  0.3  /  DBili  x   /  AST  16  /  ALT  27  /  AlkPhos  72  -      Urinalysis Basic - ( 13 Dec 2018 08:51 )    Color: YELLOW / Appearance: CLEAR / S.030 / pH: 6.0  Gluc: NEGATIVE / Ketone: NEGATIVE  / Bili: NEGATIVE / Urobili: NORMAL   Blood: NEGATIVE / Protein: 20 / Nitrite: NEGATIVE   Leuk Esterase: NEGATIVE / RBC: 0-2 / WBC 3-5   Sq Epi: OCC / Non Sq Epi: x / Bacteria: NEGATIVE        MICROBIOLOGY:  URINE MIDSTREAM  18 --  --  --    RADIOLOGY:    EXAM:  US SCROTUM AND CONTENTS    PROCEDURE DATE:  Dec 13 2018   Bilateral epididymoorchitis.

## 2018-12-14 NOTE — CONSULT NOTE ADULT - ASSESSMENT
38M with no PMHx presented with worsening scrotal swelling and pain due to epididymoorchitis. Patient started on ceftraixone and doxy. wbc increased from 26 to 40 today. clinically no change. non-toxic. ID consulted by urology.
38 year old male with no PMH who presents with bilateral scrotal swelling and pain. WBC on day of presentation of 26.06 with 87.5% PMN and 5.2% bands. U/A with 3-5 WBC. Urine GC/Chlam negative. US of testes with bilateral epididymoorchitis. On presentation febrile to 100.9, tachycardic to > 90 but normotensive. WBC on day after admission of 40.8. Febrile today to 101.9.    Overall, bilateral epididymoorchitis and possible cellulitis of the penis/scrotum. Uncertain initial event leading to presentation - mechanical injury is possible but it is curious that he had a UTI ~1-2 months prior. Concern for more serious infection i.e. Jt's gangrene given significant rise in WBC count and fever. Would broaden antibiotics to Vancomycin and Zosyn and obtain CT Abdomen/Pelvis with IV contrast to evaluate for Jt's gangrene. Patient does not have true PCN allergy - mother and grandmother had reaction and recommended patient to avoid PCN.    --Recommend stopping Ceftriaxone and Doxycycline  --Recommend Vancomycin 1.25 g IV Q12H. Trough prior to fourth dose  --Recommend Zosyn 3.375 mg IV Q8H  --Recommend CT Abdomen/Pelvis with IV contrast  --Recommend HIV test (consent obtained and order placed)  --F/U BCx  --F/U UCx

## 2018-12-14 NOTE — PROGRESS NOTE ADULT - PROBLEM SELECTOR PLAN 1
AM labs reviewed  Continue abx, ? broaden will d/w Dr. Mercer  Scrotal elevation, ice packs  DVT prophy  OOB AM labs reviewed  Continue abx,  ID consult  Scrotal elevation, ice packs  DVT prophy  OOB

## 2018-12-15 LAB
BUN SERPL-MCNC: 9 MG/DL — SIGNIFICANT CHANGE UP (ref 7–23)
CALCIUM SERPL-MCNC: 8.8 MG/DL — SIGNIFICANT CHANGE UP (ref 8.4–10.5)
CHLORIDE SERPL-SCNC: 97 MMOL/L — LOW (ref 98–107)
CO2 SERPL-SCNC: 26 MMOL/L — SIGNIFICANT CHANGE UP (ref 22–31)
CREAT SERPL-MCNC: 1.05 MG/DL — SIGNIFICANT CHANGE UP (ref 0.5–1.3)
GLUCOSE SERPL-MCNC: 123 MG/DL — HIGH (ref 70–99)
HCT VFR BLD CALC: 41.8 % — SIGNIFICANT CHANGE UP (ref 39–50)
HGB BLD-MCNC: 13.9 G/DL — SIGNIFICANT CHANGE UP (ref 13–17)
HIV 1+2 AB+HIV1 P24 AG SERPL QL IA: SIGNIFICANT CHANGE UP
MCHC RBC-ENTMCNC: 29.8 PG — SIGNIFICANT CHANGE UP (ref 27–34)
MCHC RBC-ENTMCNC: 33.3 % — SIGNIFICANT CHANGE UP (ref 32–36)
MCV RBC AUTO: 89.7 FL — SIGNIFICANT CHANGE UP (ref 80–100)
NRBC # FLD: 0 — SIGNIFICANT CHANGE UP
PLATELET # BLD AUTO: 302 K/UL — SIGNIFICANT CHANGE UP (ref 150–400)
PMV BLD: 10.2 FL — SIGNIFICANT CHANGE UP (ref 7–13)
POTASSIUM SERPL-MCNC: 3.6 MMOL/L — SIGNIFICANT CHANGE UP (ref 3.5–5.3)
POTASSIUM SERPL-SCNC: 3.6 MMOL/L — SIGNIFICANT CHANGE UP (ref 3.5–5.3)
RBC # BLD: 4.66 M/UL — SIGNIFICANT CHANGE UP (ref 4.2–5.8)
RBC # FLD: 13.3 % — SIGNIFICANT CHANGE UP (ref 10.3–14.5)
SODIUM SERPL-SCNC: 134 MMOL/L — LOW (ref 135–145)
WBC # BLD: 37.65 K/UL — HIGH (ref 3.8–10.5)
WBC # FLD AUTO: 37.65 K/UL — HIGH (ref 3.8–10.5)

## 2018-12-15 PROCEDURE — 74177 CT ABD & PELVIS W/CONTRAST: CPT | Mod: 26

## 2018-12-15 PROCEDURE — 99222 1ST HOSP IP/OBS MODERATE 55: CPT

## 2018-12-15 PROCEDURE — 99232 SBSQ HOSP IP/OBS MODERATE 35: CPT | Mod: GC

## 2018-12-15 PROCEDURE — 99233 SBSQ HOSP IP/OBS HIGH 50: CPT

## 2018-12-15 RX ORDER — IBUPROFEN 200 MG
600 TABLET ORAL EVERY 6 HOURS
Qty: 0 | Refills: 0 | Status: DISCONTINUED | OUTPATIENT
Start: 2018-12-15 | End: 2018-12-17

## 2018-12-15 RX ADMIN — MORPHINE SULFATE 4 MILLIGRAM(S): 50 CAPSULE, EXTENDED RELEASE ORAL at 09:30

## 2018-12-15 RX ADMIN — Medication 166.67 MILLIGRAM(S): at 05:14

## 2018-12-15 RX ADMIN — SENNA PLUS 2 TABLET(S): 8.6 TABLET ORAL at 22:33

## 2018-12-15 RX ADMIN — PIPERACILLIN AND TAZOBACTAM 25 GRAM(S): 4; .5 INJECTION, POWDER, LYOPHILIZED, FOR SOLUTION INTRAVENOUS at 22:34

## 2018-12-15 RX ADMIN — Medication 600 MILLIGRAM(S): at 18:51

## 2018-12-15 RX ADMIN — Medication 100 MILLIGRAM(S): at 05:14

## 2018-12-15 RX ADMIN — MORPHINE SULFATE 4 MILLIGRAM(S): 50 CAPSULE, EXTENDED RELEASE ORAL at 22:34

## 2018-12-15 RX ADMIN — HEPARIN SODIUM 5000 UNIT(S): 5000 INJECTION INTRAVENOUS; SUBCUTANEOUS at 05:14

## 2018-12-15 RX ADMIN — HEPARIN SODIUM 5000 UNIT(S): 5000 INJECTION INTRAVENOUS; SUBCUTANEOUS at 17:19

## 2018-12-15 RX ADMIN — MORPHINE SULFATE 4 MILLIGRAM(S): 50 CAPSULE, EXTENDED RELEASE ORAL at 08:48

## 2018-12-15 RX ADMIN — SODIUM CHLORIDE 50 MILLILITER(S): 9 INJECTION, SOLUTION INTRAVENOUS at 05:14

## 2018-12-15 RX ADMIN — Medication 600 MILLIGRAM(S): at 11:04

## 2018-12-15 RX ADMIN — Medication 400 MILLIGRAM(S): at 00:38

## 2018-12-15 RX ADMIN — SODIUM CHLORIDE 50 MILLILITER(S): 9 INJECTION, SOLUTION INTRAVENOUS at 22:34

## 2018-12-15 RX ADMIN — PIPERACILLIN AND TAZOBACTAM 25 GRAM(S): 4; .5 INJECTION, POWDER, LYOPHILIZED, FOR SOLUTION INTRAVENOUS at 05:15

## 2018-12-15 RX ADMIN — MORPHINE SULFATE 4 MILLIGRAM(S): 50 CAPSULE, EXTENDED RELEASE ORAL at 23:30

## 2018-12-15 RX ADMIN — PIPERACILLIN AND TAZOBACTAM 25 GRAM(S): 4; .5 INJECTION, POWDER, LYOPHILIZED, FOR SOLUTION INTRAVENOUS at 13:06

## 2018-12-15 RX ADMIN — Medication 100 MILLIGRAM(S): at 22:34

## 2018-12-15 RX ADMIN — Medication 166.67 MILLIGRAM(S): at 17:18

## 2018-12-15 RX ADMIN — Medication 600 MILLIGRAM(S): at 13:03

## 2018-12-15 RX ADMIN — Medication 100 MILLIGRAM(S): at 13:03

## 2018-12-15 NOTE — PROGRESS NOTE ADULT - PROBLEM SELECTOR PLAN 1
AM labs reviewed, HIV pending  Continue abx, vanco trough tomorrow  ID consult appreciated  F/U CT scan  Scrotal elevation, ice packs  DVT prophy  OOB

## 2018-12-15 NOTE — PROGRESS NOTE ADULT - SUBJECTIVE AND OBJECTIVE BOX
Patient is a 38y old  Male who presents with a chief complaint of Epididimo-orchitis (15 Dec 2018 09:10)      SUBJECTIVE / OVERNIGHT EVENTS:  remain febrile overnight. otherwise VSS. pt seen and examined. no new complaints, feeling well. questions answered.    Review of Systems:   Gen: +fever, no chill  Pulm: no cough, no SOB  Card: no chest pain, no palpation  Abd: no abd pain; no diarrhea  : scrotal pain  Ext: no joint pain, no swelling     MEDICATIONS  (STANDING):  docusate sodium 100 milliGRAM(s) Oral three times a day  heparin  Injectable 5000 Unit(s) SubCutaneous every 12 hours  ibuprofen  Tablet. 600 milliGRAM(s) Oral every 6 hours  lactated ringers. 1000 milliLiter(s) (50 mL/Hr) IV Continuous <Continuous>  piperacillin/tazobactam IVPB. 3.375 Gram(s) IV Intermittent every 8 hours  senna 2 Tablet(s) Oral at bedtime  vancomycin  IVPB      vancomycin  IVPB 1250 milliGRAM(s) IV Intermittent every 12 hours    MEDICATIONS  (PRN):  morphine  - Injectable 4 milliGRAM(s) IV Push every 3 hours PRN Severe Pain (7 - 10)  ondansetron Injectable 4 milliGRAM(s) IV Push every 6 hours PRN Nausea and/or Vomiting  oxyCODONE    IR 15 milliGRAM(s) Oral every 6 hours PRN Moderate Pain (4 - 6)      PHYSICAL EXAM:  T(C): 37.2 (12-15-18 @ 05:05), Max: 39.3 (12-14-18 @ 17:24)  HR: 95 (12-15-18 @ 05:05) (95 - 100)  BP: 126/75 (12-15-18 @ 05:05) (126/75 - 149/77)  RR: 18 (12-15-18 @ 05:05) (18 - 18)  SpO2: 97% (12-15-18 @ 05:05) (97% - 98%)  I&O's Summary    14 Dec 2018 07:01  -  15 Dec 2018 07:00  --------------------------------------------------------  IN: 0 mL / OUT: 1600 mL / NET: -1600 mL          LABS:  CAPILLARY BLOOD GLUCOSE                              13.9   37.65 )-----------( 302      ( 15 Dec 2018 06:25 )             41.8     12-15    134<L>  |  97<L>  |  9   ----------------------------<  123<H>  3.6   |  26  |  1.05    Ca    8.8      15 Dec 2018 06:25                RADIOLOGY & ADDITIONAL TESTS:    Imaging Personally Reviewed:    Consultant(s) Notes Reviewed:      Care Discussed with Consultants/Other Providers:

## 2018-12-15 NOTE — PROGRESS NOTE ADULT - PROBLEM SELECTOR PLAN 1
remain febrile and leukocytosis.  ID recs appreciated  CT A/P unremarkable for new findings; no collections.  all cx NTD  c/w vanc/zosyn per ID rec

## 2018-12-15 NOTE — PROGRESS NOTE ADULT - SUBJECTIVE AND OBJECTIVE BOX
Follow Up:  b/l epididymoorchitis    Interval History: Febrile overnight. Still w pain and erythema of scrotum.     ROS:    All other systems negative    Constitutional:  fever, no chills  Head: no trauma  Eyes: no vision changes, no eye pain  ENT:  no sore throat, no rhinorrhea  Cardiovascular:  no chest pain, no palpitation  Respiratory:  no SOB, no cough  GI:  no abd pain, no vomiting, no diarrhea  urinary: no dysuria, scrotal swelling and erythema  musculoskeletal:  no joint pain, no joint swelling  skin:  no rash  neurology:  no headache        Allergies  No Known Allergies        ANTIMICROBIALS:  piperacillin/tazobactam IVPB. 3.375 every 8 hours  vancomycin  IVPB    vancomycin  IVPB 1250 every 12 hours      OTHER MEDS:  docusate sodium 100 milliGRAM(s) Oral three times a day  heparin  Injectable 5000 Unit(s) SubCutaneous every 12 hours  lactated ringers. 1000 milliLiter(s) IV Continuous <Continuous>  morphine  - Injectable 4 milliGRAM(s) IV Push every 3 hours PRN  ondansetron Injectable 4 milliGRAM(s) IV Push every 6 hours PRN  oxyCODONE    IR 15 milliGRAM(s) Oral every 6 hours PRN  senna 2 Tablet(s) Oral at bedtime      Vital Signs Last 24 Hrs  T(C): 37.2 (15 Dec 2018 05:05), Max: 39.3 (14 Dec 2018 17:24)  T(F): 99 (15 Dec 2018 05:05), Max: 102.8 (14 Dec 2018 17:24)  HR: 95 (15 Dec 2018 05:05) (91 - 100)  BP: 126/75 (15 Dec 2018 05:05) (126/75 - 149/77)  BP(mean): --  RR: 18 (15 Dec 2018 05:05) (17 - 18)  SpO2: 97% (15 Dec 2018 05:05) (96% - 98%)    Physical Exam:  General:    NAD,  non toxic, A&O x 3  Head: atraumatic, normocephalic  Eye: normal sclera and conjunctiva  ENT:    no oropharyngeal lesions,   no LAD,   neck supple  Cardio:     regular S1, S2  Respiratory:    clear b/l,    no wheezing  abd:     soft,   BS +,   no tenderness,    no organomegaly  :   whole scrotum is swollen and erythematous, some ttp, no external necrosis seen in perineal region  Musculoskeletal:   no joint swelling,   no edema  Skin:    refer to   Neurologic:     no focal deficit  psych: normal affect                          13.9   37.65 )-----------( 302      ( 15 Dec 2018 06:25 )             41.8       12-15    134<L>  |  97<L>  |  9   ----------------------------<  123<H>  3.6   |  26  |  1.05    Ca    8.8      15 Dec 2018 06:25      MICROBIOLOGY:  BLOOD VENOUS  12-13-18 --  --  --      URINE MIDSTREAM  12-13-18 --  --  -          RADIOLOGY:    CT a/p pending Follow Up:  b/l epididymoorchitis    Interval History: Febrile overnight. Still w pain and erythema of scrotum.     ROS:    All other systems negative    Constitutional:  fever, no chills  Head: no trauma  Eyes: no vision changes, no eye pain  ENT:  no sore throat, no rhinorrhea  Cardiovascular:  no chest pain, no palpitation  Respiratory:  no SOB, no cough  GI:  no abd pain, no vomiting, no diarrhea  urinary: no dysuria, scrotal swelling and erythema  musculoskeletal:  no joint pain, no joint swelling  skin:  no rash  neurology:  no headache        Allergies  No Known Allergies        ANTIMICROBIALS:  piperacillin/tazobactam IVPB. 3.375 every 8 hours  vancomycin  IVPB    vancomycin  IVPB 1250 every 12 hours      OTHER MEDS:  docusate sodium 100 milliGRAM(s) Oral three times a day  heparin  Injectable 5000 Unit(s) SubCutaneous every 12 hours  lactated ringers. 1000 milliLiter(s) IV Continuous <Continuous>  morphine  - Injectable 4 milliGRAM(s) IV Push every 3 hours PRN  ondansetron Injectable 4 milliGRAM(s) IV Push every 6 hours PRN  oxyCODONE    IR 15 milliGRAM(s) Oral every 6 hours PRN  senna 2 Tablet(s) Oral at bedtime      Vital Signs Last 24 Hrs  T(C): 37.2 (15 Dec 2018 05:05), Max: 39.3 (14 Dec 2018 17:24)  T(F): 99 (15 Dec 2018 05:05), Max: 102.8 (14 Dec 2018 17:24)  HR: 95 (15 Dec 2018 05:05) (91 - 100)  BP: 126/75 (15 Dec 2018 05:05) (126/75 - 149/77)  BP(mean): --  RR: 18 (15 Dec 2018 05:05) (17 - 18)  SpO2: 97% (15 Dec 2018 05:05) (96% - 98%)    Physical Exam:  General:    NAD,  non toxic, A&O x 3  Head: atraumatic, normocephalic  Eye: normal sclera and conjunctiva  ENT:    no oropharyngeal lesions,   no LAD,   neck supple  Cardio:     regular S1, S2  Respiratory:    clear b/l,    no wheezing  abd:     soft,   BS +,   no tenderness,    no organomegaly  :   whole scrotum is swollen and erythematous, some ttp, no external necrosis seen in perineal region  Musculoskeletal:   no joint swelling,   no edema  Skin:    refer to   Neurologic:     no focal deficit  psych: normal affect                          13.9   37.65 )-----------( 302      ( 15 Dec 2018 06:25 )             41.8       12-15    134<L>  |  97<L>  |  9   ----------------------------<  123<H>  3.6   |  26  |  1.05    Ca    8.8      15 Dec 2018 06:25      MICROBIOLOGY:  BLOOD VENOUS  12-13-18 --  --  --      URINE MIDSTREAM  12-13-18 --  --  -          RADIOLOGY:    CT Abdomen and Pelvis w/ IV Cont (12.15.18 @ 09:39) >  Marked scrotal edema with a small right hydrocele and enlarged right   epididymis. Findings are consistent with history of epididymoorchitis.

## 2018-12-15 NOTE — PROGRESS NOTE ADULT - SUBJECTIVE AND OBJECTIVE BOX
Overnight events:  Pt febrile to 102 at 10pm with stable VS, ID consult obtained, abx changed to zosyn/vanco, CT ordered    Subjective:  Pt feels better this am, states pain in testicles better, voiding without problem    Objective:    Vital signs  T(C): , Max: 39.3 (12-14-18 @ 17:24)  HR: 95 (12-15-18 @ 05:05)  BP: 126/75 (12-15-18 @ 05:05)  SpO2: 97% (12-15-18 @ 05:05)  Wt(kg): --    Output   Void: 1200      Gen: NAD  Abd: soft, nontender  : erythema and edema of penile shaft increased, no crepitus, stable erythema scrotum, b/l testicles/epididymi tender, swollen right greater than left, some edema mid groin, marked, nontender, no crepitus, no erythema or crepitus of perineum      Labs                        13.9   37.65 )-----------( 302      ( 15 Dec 2018 06:25 )             41.8     15 Dec 2018 06:25    134    |  97     |  9      ----------------------------<  123    3.6     |  26     |  1.05     Ca    8.8        15 Dec 2018 06:25        Urine Cx:   Culture - Urine (12.13.18 @ 10:40)    Culture - Urine:   NO GROWTH AT 24 HOURS    Specimen Source: URINE MIDSTREAM    Culture - Blood (12.13.18 @ 12:25)    Culture - Blood:   NO ORGANISMS ISOLATED  NO ORGANISMS ISOLATED AT 24 HOURS    Specimen Source: BLOOD PERIPHERAL    Culture - Blood (12.13.18 @ 12:25)    Culture - Blood:   NO ORGANISMS ISOLATED  NO ORGANISMS ISOLATED AT 24 HOURS    Specimen Source: BLOOD VENOUS Pt febrile to 102 at 10pm with stable VS, ID consult obtained, abx changed to zosyn/vanco.  CT performed this AM, awaiting official read, but no obvious gas or collection.  Pt feels better this am, states pain in testicles better, voiding without problem    T(F): 99, Max: 102.8 (12-14-18 @ 17:24)  HR: 95  BP: 126/75  SpO2: 97%    Voided: 1600 mL    Gen: NAD  Abd: soft, nontender  : erythema and edema of penile shaft increased, no crepitus, stable erythema scrotum, b/l testicles/epididymi tender, swollen right greater than left, some edema mid groin, marked, nontender, no crepitus, no erythema or crepitus of perineum    12-15 @ 06:25  WBC 37.65 / Hct 41.8  / SCr 1.05     12-14 @ 05:50    WBC 40.80 / Hct 43.3  / SCr 1.00     Urine Cx:   NO GROWTH AT 24 HOURS    Blood Cx:  NO ORGANISMS ISOLATED AT 24 HOURS

## 2018-12-16 LAB
HCT VFR BLD CALC: 39.7 % — SIGNIFICANT CHANGE UP (ref 39–50)
HGB BLD-MCNC: 13.2 G/DL — SIGNIFICANT CHANGE UP (ref 13–17)
MCHC RBC-ENTMCNC: 29.8 PG — SIGNIFICANT CHANGE UP (ref 27–34)
MCHC RBC-ENTMCNC: 33.2 % — SIGNIFICANT CHANGE UP (ref 32–36)
MCV RBC AUTO: 89.6 FL — SIGNIFICANT CHANGE UP (ref 80–100)
NRBC # FLD: 0 — SIGNIFICANT CHANGE UP
PLATELET # BLD AUTO: 302 K/UL — SIGNIFICANT CHANGE UP (ref 150–400)
PMV BLD: 9.8 FL — SIGNIFICANT CHANGE UP (ref 7–13)
RBC # BLD: 4.43 M/UL — SIGNIFICANT CHANGE UP (ref 4.2–5.8)
RBC # FLD: 13.3 % — SIGNIFICANT CHANGE UP (ref 10.3–14.5)
VANCOMYCIN TROUGH SERPL-MCNC: 5.1 UG/ML — LOW (ref 10–20)
WBC # BLD: 29.82 K/UL — HIGH (ref 3.8–10.5)
WBC # FLD AUTO: 29.82 K/UL — HIGH (ref 3.8–10.5)

## 2018-12-16 PROCEDURE — 99232 SBSQ HOSP IP/OBS MODERATE 35: CPT

## 2018-12-16 PROCEDURE — 99233 SBSQ HOSP IP/OBS HIGH 50: CPT

## 2018-12-16 RX ORDER — VANCOMYCIN HCL 1 G
1000 VIAL (EA) INTRAVENOUS EVERY 8 HOURS
Qty: 0 | Refills: 0 | Status: DISCONTINUED | OUTPATIENT
Start: 2018-12-16 | End: 2018-12-17

## 2018-12-16 RX ADMIN — OXYCODONE HYDROCHLORIDE 15 MILLIGRAM(S): 5 TABLET ORAL at 18:00

## 2018-12-16 RX ADMIN — OXYCODONE HYDROCHLORIDE 15 MILLIGRAM(S): 5 TABLET ORAL at 08:30

## 2018-12-16 RX ADMIN — Medication 600 MILLIGRAM(S): at 00:50

## 2018-12-16 RX ADMIN — Medication 250 MILLIGRAM(S): at 17:24

## 2018-12-16 RX ADMIN — PIPERACILLIN AND TAZOBACTAM 25 GRAM(S): 4; .5 INJECTION, POWDER, LYOPHILIZED, FOR SOLUTION INTRAVENOUS at 06:28

## 2018-12-16 RX ADMIN — Medication 600 MILLIGRAM(S): at 06:27

## 2018-12-16 RX ADMIN — PIPERACILLIN AND TAZOBACTAM 25 GRAM(S): 4; .5 INJECTION, POWDER, LYOPHILIZED, FOR SOLUTION INTRAVENOUS at 21:33

## 2018-12-16 RX ADMIN — Medication 100 MILLIGRAM(S): at 06:27

## 2018-12-16 RX ADMIN — Medication 100 MILLIGRAM(S): at 21:34

## 2018-12-16 RX ADMIN — Medication 600 MILLIGRAM(S): at 12:04

## 2018-12-16 RX ADMIN — SODIUM CHLORIDE 50 MILLILITER(S): 9 INJECTION, SOLUTION INTRAVENOUS at 07:38

## 2018-12-16 RX ADMIN — SODIUM CHLORIDE 50 MILLILITER(S): 9 INJECTION, SOLUTION INTRAVENOUS at 06:28

## 2018-12-16 RX ADMIN — OXYCODONE HYDROCHLORIDE 15 MILLIGRAM(S): 5 TABLET ORAL at 17:25

## 2018-12-16 RX ADMIN — PIPERACILLIN AND TAZOBACTAM 25 GRAM(S): 4; .5 INJECTION, POWDER, LYOPHILIZED, FOR SOLUTION INTRAVENOUS at 14:42

## 2018-12-16 RX ADMIN — Medication 250 MILLIGRAM(S): at 09:08

## 2018-12-16 RX ADMIN — Medication 100 MILLIGRAM(S): at 14:42

## 2018-12-16 RX ADMIN — HEPARIN SODIUM 5000 UNIT(S): 5000 INJECTION INTRAVENOUS; SUBCUTANEOUS at 17:24

## 2018-12-16 RX ADMIN — SODIUM CHLORIDE 50 MILLILITER(S): 9 INJECTION, SOLUTION INTRAVENOUS at 09:07

## 2018-12-16 RX ADMIN — Medication 600 MILLIGRAM(S): at 17:24

## 2018-12-16 RX ADMIN — OXYCODONE HYDROCHLORIDE 15 MILLIGRAM(S): 5 TABLET ORAL at 07:37

## 2018-12-16 RX ADMIN — SENNA PLUS 2 TABLET(S): 8.6 TABLET ORAL at 21:34

## 2018-12-16 RX ADMIN — HEPARIN SODIUM 5000 UNIT(S): 5000 INJECTION INTRAVENOUS; SUBCUTANEOUS at 06:28

## 2018-12-16 NOTE — PROGRESS NOTE ADULT - SUBJECTIVE AND OBJECTIVE BOX
SUBJECTIVE:  Patient's CT showed no identifiable collections  Afebrile for >24h, pain controlled/unchanged  Cellulitis improving from yesterday  Vanc trough this morning ~5, changed to 1g q8h    OBJECTIVE:  Vital Signs Last 24 Hrs  T(C): 37 (16 Dec 2018 05:57), Max: 37.1 (15 Dec 2018 22:26)  T(F): 98.6 (16 Dec 2018 05:57), Max: 98.8 (15 Dec 2018 22:26)  HR: 86 (16 Dec 2018 05:57) (84 - 87)  BP: 122/74 (16 Dec 2018 05:57) (115/65 - 131/64)  BP(mean): --  RR: 18 (16 Dec 2018 01:47) (18 - 18)  SpO2: 95% (16 Dec 2018 05:57) (94% - 98%)      Gen: NAD  Abd: soft, nontender  : erythema and edema of penile shaft decreased, no crepitus, stable erythema scrotum, R. testicles/epididymis tender, swollen right greater than left, some edema mid groin, marked, nontender, no crepitus, no erythema or crepitus of perineum    LABS:  CBC (12-16 @ 05:24)                              13.2                           29.82<H>  )----------------(  302        --    % Neutrophils, --    % Lymphocytes, ANC: --                                  39.7                CBC (12-15 @ 06:25)                              13.9                           37.65<H>  )----------------(  302        --    % Neutrophils, --    % Lymphocytes, ANC: --                                  41.8                  BMP (12-15 @ 06:25)             134<L>  |  97<L>   |  9     		Ca++ --      Ca 8.8                ---------------------------------( 123<H>		Mg --                 3.6     |  26      |  1.05  			Ph --                -> BLOOD VENOUS Culture (12-13 @ 12:25)     NG    NG  NG    -> URINE MIDSTREAM Culture (12-13 @ 10:40)     NG    NG  NG

## 2018-12-16 NOTE — PROGRESS NOTE ADULT - SUBJECTIVE AND OBJECTIVE BOX
Patient is a 38y old  Male who presents with a chief complaint of Epididimo-orchitis (16 Dec 2018 08:07)      SUBJECTIVE / OVERNIGHT EVENTS:  pain improving, erythema decreasing. patient feeling well this morning, no new complaints.     Review of Systems:   Gen: no fever, no chill  Pulm: no cough, no SOB  Card: no chest pain, no palpation  Abd: no abd pain; no diarrhea  Ext: no joint pain, no swelling     MEDICATIONS  (STANDING):  docusate sodium 100 milliGRAM(s) Oral three times a day  heparin  Injectable 5000 Unit(s) SubCutaneous every 12 hours  ibuprofen  Tablet. 600 milliGRAM(s) Oral every 6 hours  lactated ringers. 1000 milliLiter(s) (50 mL/Hr) IV Continuous <Continuous>  piperacillin/tazobactam IVPB. 3.375 Gram(s) IV Intermittent every 8 hours  senna 2 Tablet(s) Oral at bedtime  vancomycin  IVPB 1000 milliGRAM(s) IV Intermittent every 8 hours    MEDICATIONS  (PRN):  morphine  - Injectable 4 milliGRAM(s) IV Push every 3 hours PRN Severe Pain (7 - 10)  ondansetron Injectable 4 milliGRAM(s) IV Push every 6 hours PRN Nausea and/or Vomiting  oxyCODONE    IR 15 milliGRAM(s) Oral every 6 hours PRN Moderate Pain (4 - 6)      PHYSICAL EXAM:  T(C): 36.7 (12-16-18 @ 09:05), Max: 37.1 (12-15-18 @ 22:26)  HR: 84 (12-16-18 @ 09:05) (84 - 87)  BP: 120/77 (12-16-18 @ 09:05) (115/65 - 131/64)  RR: 17 (12-16-18 @ 09:05) (17 - 18)  SpO2: 95% (12-16-18 @ 09:05) (94% - 98%)  I&O's Summary    16 Dec 2018 07:01  -  16 Dec 2018 12:31  --------------------------------------------------------  IN: 0 mL / OUT: 1200 mL / NET: -1200 mL    Gen: NAD; resting in chair  Pulm: no respiratory distress; CTA b/l; no wheezing  Card: RRR; S1/S2 wnl; no obvious murmurs  Abd: soft; +bs; NT/ND  : scrotal swelling and erythema, improved  Ext: no joint swelling; no edema  Devices: no mitchell, no central line         LABS:  CAPILLARY BLOOD GLUCOSE                              13.2   29.82 )-----------( 302      ( 16 Dec 2018 05:24 )             39.7     12-15    134<L>  |  97<L>  |  9   ----------------------------<  123<H>  3.6   |  26  |  1.05    Ca    8.8      15 Dec 2018 06:25                RADIOLOGY & ADDITIONAL TESTS:    Imaging Personally Reviewed:    Consultant(s) Notes Reviewed:      Care Discussed with Consultants/Other Providers:

## 2018-12-16 NOTE — PROGRESS NOTE ADULT - PROBLEM SELECTOR PLAN 1
AM CBC showing improved leukocytosis  HIV test negative  Continue vanc/zosyn  F/U cultures  Medicine/ID recommendations appreciated

## 2018-12-17 DIAGNOSIS — D72.829 ELEVATED WHITE BLOOD CELL COUNT, UNSPECIFIED: ICD-10-CM

## 2018-12-17 LAB
HCT VFR BLD CALC: 42.5 % — SIGNIFICANT CHANGE UP (ref 39–50)
HGB BLD-MCNC: 13.8 G/DL — SIGNIFICANT CHANGE UP (ref 13–17)
MCHC RBC-ENTMCNC: 29.4 PG — SIGNIFICANT CHANGE UP (ref 27–34)
MCHC RBC-ENTMCNC: 32.5 % — SIGNIFICANT CHANGE UP (ref 32–36)
MCV RBC AUTO: 90.6 FL — SIGNIFICANT CHANGE UP (ref 80–100)
NRBC # FLD: 0 — SIGNIFICANT CHANGE UP
PLATELET # BLD AUTO: 352 K/UL — SIGNIFICANT CHANGE UP (ref 150–400)
PMV BLD: 10.3 FL — SIGNIFICANT CHANGE UP (ref 7–13)
RBC # BLD: 4.69 M/UL — SIGNIFICANT CHANGE UP (ref 4.2–5.8)
RBC # FLD: 13.6 % — SIGNIFICANT CHANGE UP (ref 10.3–14.5)
VANCOMYCIN TROUGH SERPL-MCNC: 11.7 UG/ML — SIGNIFICANT CHANGE UP (ref 10–20)
WBC # BLD: 25.47 K/UL — HIGH (ref 3.8–10.5)
WBC # FLD AUTO: 25.47 K/UL — HIGH (ref 3.8–10.5)

## 2018-12-17 PROCEDURE — 99232 SBSQ HOSP IP/OBS MODERATE 35: CPT

## 2018-12-17 RX ORDER — KETOROLAC TROMETHAMINE 30 MG/ML
30 SYRINGE (ML) INJECTION EVERY 6 HOURS
Qty: 0 | Refills: 0 | Status: DISCONTINUED | OUTPATIENT
Start: 2018-12-17 | End: 2018-12-20

## 2018-12-17 RX ORDER — KETOROLAC TROMETHAMINE 30 MG/ML
15 SYRINGE (ML) INJECTION EVERY 6 HOURS
Qty: 0 | Refills: 0 | Status: DISCONTINUED | OUTPATIENT
Start: 2018-12-17 | End: 2018-12-17

## 2018-12-17 RX ADMIN — OXYCODONE HYDROCHLORIDE 15 MILLIGRAM(S): 5 TABLET ORAL at 00:20

## 2018-12-17 RX ADMIN — Medication 100 MILLIGRAM(S): at 21:14

## 2018-12-17 RX ADMIN — OXYCODONE HYDROCHLORIDE 15 MILLIGRAM(S): 5 TABLET ORAL at 01:20

## 2018-12-17 RX ADMIN — Medication 30 MILLIGRAM(S): at 11:54

## 2018-12-17 RX ADMIN — Medication 250 MILLIGRAM(S): at 00:21

## 2018-12-17 RX ADMIN — PIPERACILLIN AND TAZOBACTAM 25 GRAM(S): 4; .5 INJECTION, POWDER, LYOPHILIZED, FOR SOLUTION INTRAVENOUS at 21:14

## 2018-12-17 RX ADMIN — OXYCODONE HYDROCHLORIDE 15 MILLIGRAM(S): 5 TABLET ORAL at 23:32

## 2018-12-17 RX ADMIN — SENNA PLUS 2 TABLET(S): 8.6 TABLET ORAL at 21:14

## 2018-12-17 RX ADMIN — Medication 100 MILLIGRAM(S): at 05:30

## 2018-12-17 RX ADMIN — Medication 600 MILLIGRAM(S): at 05:30

## 2018-12-17 RX ADMIN — SODIUM CHLORIDE 50 MILLILITER(S): 9 INJECTION, SOLUTION INTRAVENOUS at 05:31

## 2018-12-17 RX ADMIN — PIPERACILLIN AND TAZOBACTAM 25 GRAM(S): 4; .5 INJECTION, POWDER, LYOPHILIZED, FOR SOLUTION INTRAVENOUS at 13:45

## 2018-12-17 RX ADMIN — OXYCODONE HYDROCHLORIDE 15 MILLIGRAM(S): 5 TABLET ORAL at 17:19

## 2018-12-17 RX ADMIN — Medication 600 MILLIGRAM(S): at 00:21

## 2018-12-17 RX ADMIN — Medication 250 MILLIGRAM(S): at 17:16

## 2018-12-17 RX ADMIN — Medication 30 MILLIGRAM(S): at 23:31

## 2018-12-17 RX ADMIN — PIPERACILLIN AND TAZOBACTAM 25 GRAM(S): 4; .5 INJECTION, POWDER, LYOPHILIZED, FOR SOLUTION INTRAVENOUS at 05:30

## 2018-12-17 RX ADMIN — Medication 30 MILLIGRAM(S): at 17:19

## 2018-12-17 RX ADMIN — Medication 100 MILLIGRAM(S): at 21:15

## 2018-12-17 RX ADMIN — HEPARIN SODIUM 5000 UNIT(S): 5000 INJECTION INTRAVENOUS; SUBCUTANEOUS at 17:17

## 2018-12-17 RX ADMIN — HEPARIN SODIUM 5000 UNIT(S): 5000 INJECTION INTRAVENOUS; SUBCUTANEOUS at 05:30

## 2018-12-17 RX ADMIN — Medication 250 MILLIGRAM(S): at 09:33

## 2018-12-17 RX ADMIN — OXYCODONE HYDROCHLORIDE 15 MILLIGRAM(S): 5 TABLET ORAL at 18:31

## 2018-12-17 NOTE — PROGRESS NOTE ADULT - SUBJECTIVE AND OBJECTIVE BOX
Patient is a 38y old  Male who presents with a chief complaint of Epididimo-orchitis (17 Dec 2018 07:37)      SUBJECTIVE / OVERNIGHT EVENTS:    MEDICATIONS  (STANDING):  docusate sodium 100 milliGRAM(s) Oral three times a day  heparin  Injectable 5000 Unit(s) SubCutaneous every 12 hours  ketorolac   Injectable 15 milliGRAM(s) IV Push every 6 hours  piperacillin/tazobactam IVPB. 3.375 Gram(s) IV Intermittent every 8 hours  senna 2 Tablet(s) Oral at bedtime  vancomycin  IVPB 1000 milliGRAM(s) IV Intermittent every 8 hours    MEDICATIONS  (PRN):  morphine  - Injectable 4 milliGRAM(s) IV Push every 3 hours PRN Severe Pain (7 - 10)  ondansetron Injectable 4 milliGRAM(s) IV Push every 6 hours PRN Nausea and/or Vomiting  oxyCODONE    IR 15 milliGRAM(s) Oral every 6 hours PRN Moderate Pain (4 - 6)    Vital Signs Last 24 Hrs  T(C): 36.5 (17 Dec 2018 09:45), Max: 37.4 (16 Dec 2018 17:47)  T(F): 97.7 (17 Dec 2018 09:45), Max: 99.4 (16 Dec 2018 17:47)  HR: 78 (17 Dec 2018 09:45) (74 - 90)  BP: 133/70 (17 Dec 2018 09:45) (117/71 - 144/77)  RR: 18 (17 Dec 2018 09:45) (16 - 18)  SpO2: 98% (17 Dec 2018 09:45) (94% - 98%)    PHYSICAL EXAM:  GENERAL: NAD, well-developed  HEAD:  Atraumatic, Normocephalic  EYES: EOMI, PERRLA, conjunctiva and sclera clear  NECK: Supple, No JVD  CHEST/LUNG: Clear to auscultation bilaterally; No wheeze  HEART: Regular rate and rhythm; No murmurs, rubs, or gallops  ABDOMEN: Soft, Nontender, Nondistended; Bowel sounds present  EXTREMITIES:  2+ Peripheral Pulses, No clubbing, cyanosis, or edema  PSYCH: AAOx3  NEUROLOGY: non-focal  SKIN: No rashes or lesions  : scrotum erythematous, swollen, tender, firmer on R    LABS:                        13.8   25.47 )-----------( 352      ( 17 Dec 2018 06:57 )             42.5     RADIOLOGY & ADDITIONAL TESTS:    Imaging Personally Reviewed:    Consultant(s) Notes Reviewed:      Care Discussed with Consultants/Other Providers: urology re overall care Patient is a 38y old  Male who presents with a chief complaint of Epididimo-orchitis (17 Dec 2018 07:37)    SUBJECTIVE / OVERNIGHT EVENTS:  Had chills prior to coming in, no further.  Scrotum still swollen and tender.  Otherwise no chest pain, SOB, nausea.    MEDICATIONS  (STANDING):  docusate sodium 100 milliGRAM(s) Oral three times a day  heparin  Injectable 5000 Unit(s) SubCutaneous every 12 hours  ketorolac   Injectable 15 milliGRAM(s) IV Push every 6 hours  piperacillin/tazobactam IVPB. 3.375 Gram(s) IV Intermittent every 8 hours  senna 2 Tablet(s) Oral at bedtime  vancomycin  IVPB 1000 milliGRAM(s) IV Intermittent every 8 hours    MEDICATIONS  (PRN):  morphine  - Injectable 4 milliGRAM(s) IV Push every 3 hours PRN Severe Pain (7 - 10)  ondansetron Injectable 4 milliGRAM(s) IV Push every 6 hours PRN Nausea and/or Vomiting  oxyCODONE    IR 15 milliGRAM(s) Oral every 6 hours PRN Moderate Pain (4 - 6)    Vital Signs Last 24 Hrs  T(C): 36.5 (17 Dec 2018 09:45), Max: 37.4 (16 Dec 2018 17:47)  T(F): 97.7 (17 Dec 2018 09:45), Max: 99.4 (16 Dec 2018 17:47)  HR: 78 (17 Dec 2018 09:45) (74 - 90)  BP: 133/70 (17 Dec 2018 09:45) (117/71 - 144/77)  RR: 18 (17 Dec 2018 09:45) (16 - 18)  SpO2: 98% (17 Dec 2018 09:45) (94% - 98%)    PHYSICAL EXAM:  GENERAL: NAD, well-developed  HEAD:  Atraumatic, Normocephalic  EYES: EOMI, PERRLA, conjunctiva and sclera clear  NECK: Supple, No JVD  CHEST/LUNG: Clear to auscultation bilaterally; No wheeze  HEART: Regular rate and rhythm; No murmurs, rubs, or gallops  ABDOMEN: Soft, Nontender, Nondistended; Bowel sounds present  EXTREMITIES:  2+ Peripheral Pulses, No clubbing, cyanosis, or edema  PSYCH: AAOx3  NEUROLOGY: non-focal  SKIN: No rashes or lesions  : scrotum erythematous, swollen, tender, firmer on R    LABS:                        13.8   25.47 )-----------( 352      ( 17 Dec 2018 06:57 )             42.5     RADIOLOGY & ADDITIONAL TESTS:  < from: CT Abdomen and Pelvis w/ IV Cont (12.15.18 @ 09:39) >  LOWER CHEST: Within normal limits.    LIVER: A subcentimeter hypodense focus in the right hepatic lobe, too   small to characterize.   BILE DUCTS: Normal caliber.  GALLBLADDER: Within normal limits.  SPLEEN: Within normal limits.  PANCREAS: Within normal limits.  ADRENALS: Within normal limits.  KIDNEYS/URETERS: Within normal limits.    BLADDER: Within normal limits.  REPRODUCTIVE ORGANS: Prostate is within normal limits. Marked scrotal   edema with a small right hydrocele and enlarged right epididymis. Right   varicocele.     BOWEL: No bowel obstruction. Normal appendix.   PERITONEUM: No ascites.  VESSELS: Within normal limits.   RETROPERITONEUM: Several less than 1 cm nonspecific upper retroperitoneal   nodes are likely reactive.  ABDOMINAL WALL: Within normal limits.  BONES: Within normal limits.    IMPRESSION:     Marked scrotal edema with a small right hydrocele and enlarged right   epididymis. Findings are consistent with history of epididymoorchitis.    Imaging Personally Reviewed:    Consultant(s) Notes Reviewed:      Care Discussed with Consultants/Other Providers: urology re overall care

## 2018-12-17 NOTE — PROGRESS NOTE ADULT - SUBJECTIVE AND OBJECTIVE BOX
Follow Up:  b/l epididymoorchitis    Interval History: Afebrile. Still w pain and erythema of scrotum right > left  Tolerated Zosyn.  ROS:    All other systems negative      Allergies  No Known Allergies        ANTIMICROBIALS:  piperacillin/tazobactam IVPB. 3.375 every 8 hours  vancomycin  IVPB    vancomycin  IVPB 1250 every 12 hours      OTHER MEDS:  docusate sodium 100 milliGRAM(s) Oral three times a day  heparin  Injectable 5000 Unit(s) SubCutaneous every 12 hours  lactated ringers. 1000 milliLiter(s) IV Continuous <Continuous>  morphine  - Injectable 4 milliGRAM(s) IV Push every 3 hours PRN  ondansetron Injectable 4 milliGRAM(s) IV Push every 6 hours PRN  oxyCODONE    IR 15 milliGRAM(s) Oral every 6 hours PRN  senna 2 Tablet(s) Oral at bedtime      Vital Signs Last 24 Hrs  T(F): 99.7 (12-17-18 @ 17:03), Max: 99.7 (12-17-18 @ 17:03)  HR: 85 (12-17-18 @ 17:03)  BP: 137/72 (12-17-18 @ 17:03)  RR: 18 (12-17-18 @ 17:03)  SpO2: 97% (12-17-18 @ 17:03) (94% - 98%)    Physical Exam:  General:    NAD,  non toxic, A&O x 3  Head: atraumatic, normocephalic  Eye: normal sclera and conjunctiva  ENT:    no oropharyngeal lesions,  neck supple  Cardio:     regular S1, S2  Respiratory:    clear   abd:     soft,   BS +,   no tenderness  :   scrotum is swollen and erythematous  Musculoskeletal:   no joint swelling,   no edema  Neurologic:     no focal deficit  psych: normal affect                          13.8   25.47 )-----------( 352      ( 17 Dec 2018 06:57 )             42.5           MICROBIOLOGY:  BLOOD VENOUS  12-13-18 --  --  --  Culture - Blood (12.13.18 @ 12:25)    Culture - Blood:   NO ORGANISMS ISOLATED  NO ORGANISMS ISOLATED AT 96 HOURS    Specimen Source: BLOOD PERIPHERAL    Culture - Urine (12.13.18 @ 10:40)    Culture - Urine:   NO GROWTH AT 24 HOURS    Specimen Source: URINE MIDSTREAM                RADIOLOGY:    CT Abdomen and Pelvis w/ IV Cont (12.15.18 @ 09:39) >  Marked scrotal edema with a small right hydrocele and enlarged right   epididymis. Findings are consistent with history of epididymoorchitis.

## 2018-12-17 NOTE — PROGRESS NOTE ADULT - PROBLEM SELECTOR PLAN 1
sepsis d/t epididymo-orchitis - CT A/P Marked scrotal edema with a small right hydrocele and enlarged right, all cx NTD, c/w vanc/zosyn per ID rec   HIV/GC/Chlam neg

## 2018-12-18 LAB
BACTERIA BLD CULT: SIGNIFICANT CHANGE UP
BACTERIA BLD CULT: SIGNIFICANT CHANGE UP
BUN SERPL-MCNC: 10 MG/DL — SIGNIFICANT CHANGE UP (ref 7–23)
CALCIUM SERPL-MCNC: 9 MG/DL — SIGNIFICANT CHANGE UP (ref 8.4–10.5)
CHLORIDE SERPL-SCNC: 95 MMOL/L — LOW (ref 98–107)
CO2 SERPL-SCNC: 28 MMOL/L — SIGNIFICANT CHANGE UP (ref 22–31)
CREAT SERPL-MCNC: 1.2 MG/DL — SIGNIFICANT CHANGE UP (ref 0.5–1.3)
GLUCOSE SERPL-MCNC: 88 MG/DL — SIGNIFICANT CHANGE UP (ref 70–99)
HCT VFR BLD CALC: 39.1 % — SIGNIFICANT CHANGE UP (ref 39–50)
HGB BLD-MCNC: 12.9 G/DL — LOW (ref 13–17)
MCHC RBC-ENTMCNC: 29.6 PG — SIGNIFICANT CHANGE UP (ref 27–34)
MCHC RBC-ENTMCNC: 33 % — SIGNIFICANT CHANGE UP (ref 32–36)
MCV RBC AUTO: 89.7 FL — SIGNIFICANT CHANGE UP (ref 80–100)
NRBC # FLD: 0 — SIGNIFICANT CHANGE UP
PLATELET # BLD AUTO: 364 K/UL — SIGNIFICANT CHANGE UP (ref 150–400)
PMV BLD: 10 FL — SIGNIFICANT CHANGE UP (ref 7–13)
POTASSIUM SERPL-MCNC: 3.7 MMOL/L — SIGNIFICANT CHANGE UP (ref 3.5–5.3)
POTASSIUM SERPL-SCNC: 3.7 MMOL/L — SIGNIFICANT CHANGE UP (ref 3.5–5.3)
RBC # BLD: 4.36 M/UL — SIGNIFICANT CHANGE UP (ref 4.2–5.8)
RBC # FLD: 13.3 % — SIGNIFICANT CHANGE UP (ref 10.3–14.5)
SODIUM SERPL-SCNC: 136 MMOL/L — SIGNIFICANT CHANGE UP (ref 135–145)
WBC # BLD: 26.65 K/UL — HIGH (ref 3.8–10.5)
WBC # FLD AUTO: 26.65 K/UL — HIGH (ref 3.8–10.5)

## 2018-12-18 PROCEDURE — 99232 SBSQ HOSP IP/OBS MODERATE 35: CPT

## 2018-12-18 RX ADMIN — OXYCODONE HYDROCHLORIDE 15 MILLIGRAM(S): 5 TABLET ORAL at 20:06

## 2018-12-18 RX ADMIN — PIPERACILLIN AND TAZOBACTAM 25 GRAM(S): 4; .5 INJECTION, POWDER, LYOPHILIZED, FOR SOLUTION INTRAVENOUS at 13:08

## 2018-12-18 RX ADMIN — Medication 100 MILLIGRAM(S): at 09:06

## 2018-12-18 RX ADMIN — OXYCODONE HYDROCHLORIDE 15 MILLIGRAM(S): 5 TABLET ORAL at 19:29

## 2018-12-18 RX ADMIN — OXYCODONE HYDROCHLORIDE 15 MILLIGRAM(S): 5 TABLET ORAL at 00:29

## 2018-12-18 RX ADMIN — Medication 30 MILLIGRAM(S): at 06:11

## 2018-12-18 RX ADMIN — Medication 30 MILLIGRAM(S): at 17:26

## 2018-12-18 RX ADMIN — Medication 100 MILLIGRAM(S): at 13:08

## 2018-12-18 RX ADMIN — Medication 30 MILLIGRAM(S): at 11:29

## 2018-12-18 RX ADMIN — HEPARIN SODIUM 5000 UNIT(S): 5000 INJECTION INTRAVENOUS; SUBCUTANEOUS at 06:11

## 2018-12-18 RX ADMIN — HEPARIN SODIUM 5000 UNIT(S): 5000 INJECTION INTRAVENOUS; SUBCUTANEOUS at 17:26

## 2018-12-18 RX ADMIN — Medication 100 MILLIGRAM(S): at 06:11

## 2018-12-18 RX ADMIN — Medication 100 MILLIGRAM(S): at 22:59

## 2018-12-18 RX ADMIN — PIPERACILLIN AND TAZOBACTAM 25 GRAM(S): 4; .5 INJECTION, POWDER, LYOPHILIZED, FOR SOLUTION INTRAVENOUS at 23:00

## 2018-12-18 RX ADMIN — SENNA PLUS 2 TABLET(S): 8.6 TABLET ORAL at 22:59

## 2018-12-18 RX ADMIN — PIPERACILLIN AND TAZOBACTAM 25 GRAM(S): 4; .5 INJECTION, POWDER, LYOPHILIZED, FOR SOLUTION INTRAVENOUS at 06:12

## 2018-12-18 RX ADMIN — Medication 30 MILLIGRAM(S): at 23:09

## 2018-12-18 NOTE — PROGRESS NOTE ADULT - SUBJECTIVE AND OBJECTIVE BOX
Follow Up:  b/l epididymoorchitis    Interval History:  "getting there"  still having pain and swelling scrotum.  no fever, chills.    ROS:    All other systems negative      Allergies  No Known Allergies        ANTIMICROBIALS: doxycycline hyclate Capsule 100 every 12 hours  piperacillin/tazobactam IVPB. 3.375 every 8 hours        OTHER MEDS:  docusate sodium 100 milliGRAM(s) Oral three times a day  heparin  Injectable 5000 Unit(s) SubCutaneous every 12 hours  lactated ringers. 1000 milliLiter(s) IV Continuous <Continuous>  morphine  - Injectable 4 milliGRAM(s) IV Push every 3 hours PRN  ondansetron Injectable 4 milliGRAM(s) IV Push every 6 hours PRN  oxyCODONE    IR 15 milliGRAM(s) Oral every 6 hours PRN  senna 2 Tablet(s) Oral at bedtime      Vital Signs Last 24 Hrs  T(F): 98.2 (12-18-18 @ 13:12), Max: 99.7 (12-17-18 @ 17:03)  HR: 87 (12-18-18 @ 13:12)  BP: 134/78 (12-18-18 @ 13:12)  RR: 18 (12-18-18 @ 13:12)  SpO2: 98% (12-18-18 @ 13:12) (93% - 98%)    Physical Exam:  General:   non toxic, comfortable  Head: atraumatic, normocephalic  Eye: normal sclera and conjunctiva  ENT:    no oropharyngeal lesions,  neck supple  Cardio:     regular S1, S2  Respiratory:    clear   abd:     soft,   BS +,   no tenderness  :   scrotum swelling, firm right side, erythema less intense, skin with over scrotum desquamating  Musculoskeletal:   no joint swelling,   no edema  Neurologic:     no focal deficit  psych: normal affect                                     12.9   26.65 )-----------( 364      ( 18 Dec 2018 05:26 )             39.1 12-18    136  |  95  |  10  ----------------------------<  88  3.7   |  28  |  1.20  Ca    9.0      18 Dec 2018 05:26        MICROBIOLOGY:  BLOOD VENOUS  12-13-18 --  --  --  Culture - Blood (12.13.18 @ 12:25)    Culture - Blood:   NO ORGANISMS ISOLATED  NO ORGANISMS ISOLATED AT 96 HOURS    Specimen Source: BLOOD PERIPHERAL    Culture - Urine (12.13.18 @ 10:40)    Culture - Urine:   NO GROWTH AT 24 HOURS    Specimen Source: URINE MIDSTREAM        RADIOLOGY:    CT Abdomen and Pelvis w/ IV Cont (12.15.18 @ 09:39) >  Marked scrotal edema with a small right hydrocele and enlarged right   epididymis. Findings are consistent with history of epididymoorchitis.

## 2018-12-18 NOTE — PROGRESS NOTE ADULT - SUBJECTIVE AND OBJECTIVE BOX
Patient is a 38y old  Male who presents with a chief complaint of Epididimo-orchitis (18 Dec 2018 07:00)    SUBJECTIVE / OVERNIGHT EVENTS:  Swelling about same, .  No fever/chills, chest pain, SOB.      MEDICATIONS  (STANDING):  docusate sodium 100 milliGRAM(s) Oral three times a day  doxycycline hyclate Capsule 100 milliGRAM(s) Oral every 12 hours  heparin  Injectable 5000 Unit(s) SubCutaneous every 12 hours  ketorolac   Injectable 30 milliGRAM(s) IV Push every 6 hours  piperacillin/tazobactam IVPB. 3.375 Gram(s) IV Intermittent every 8 hours  senna 2 Tablet(s) Oral at bedtime    MEDICATIONS  (PRN):  morphine  - Injectable 4 milliGRAM(s) IV Push every 3 hours PRN Severe Pain (7 - 10)  ondansetron Injectable 4 milliGRAM(s) IV Push every 6 hours PRN Nausea and/or Vomiting  oxyCODONE    IR 15 milliGRAM(s) Oral every 6 hours PRN Moderate Pain (4 - 6)    Vital Signs Last 24 Hrs  T(C): 37.4 (18 Dec 2018 06:03), Max: 37.6 (17 Dec 2018 17:03)  T(F): 99.4 (18 Dec 2018 06:03), Max: 99.7 (17 Dec 2018 17:03)  HR: 89 (18 Dec 2018 06:03) (78 - 89)  BP: 122/69 (18 Dec 2018 06:03) (107/56 - 144/75)  RR: 18 (18 Dec 2018 06:03) (18 - 18)  SpO2: 95% (18 Dec 2018 06:03) (93% - 98%)    PHYSICAL EXAM:  GENERAL: young WM, sitting up on couch  HEAD:  Atraumatic, Normocephalic  EYES: EOMI, PERRLA, conjunctiva and sclera clear  NECK: Supple, No JVD  CHEST/LUNG: Clear to auscultation bilaterally; No wheeze  HEART: Regular rate and rhythm; No murmurs, rubs, or gallops  ABDOMEN: Soft, Nontender, Nondistended; Bowel sounds present  EXTREMITIES:  2+ Peripheral Pulses, No clubbing, cyanosis, or edema  PSYCH: AAOx3  NEUROLOGY: non-focal  SKIN: No rashes or lesions  : scrotum erythematous, swollen, tender, firmer on R    LABS:             12.9   26.65 )-----------( 364      ( 18 Dec 2018 05:26 )             39.1     136  |  95<L>  |  10  ----------------------------<  88  3.7   |  28  |  1.20    Ca    9.0      18 Dec 2018 05:26    RADIOLOGY & ADDITIONAL TESTS:    Imaging Personally Reviewed:    Consultant(s) Notes Reviewed:      Care Discussed with Consultants/Other Providers: RN, SW, CM, ADS re overall care

## 2018-12-18 NOTE — PROGRESS NOTE ADULT - SUBJECTIVE AND OBJECTIVE BOX
Subjective: Reports feeling well,  improving pain, no fevers.    Objective    Vital signs  T(F): , Max: 99.7 (12-17-18 @ 17:03)  HR: 89 (12-18-18 @ 06:03)  BP: 122/69 (12-18-18 @ 06:03)  SpO2: 95% (12-18-18 @ 06:03)  Wt(kg): --    Output   Void 720    Gen: well appearing, A+Ox3, no acute distress  Abd: soft, nontender  : scrotal edema with erythema, tenderness present    Labs    12-18 @ 05:26    WBC 26.65 / Hct 39.1  / SCr 1.20     12-17 @ 06:57    WBC 25.47 / Hct 42.5  / SCr --

## 2018-12-18 NOTE — PROGRESS NOTE ADULT - PROBLEM SELECTOR PLAN 1
sepsis d/t epididymo-orchitis - CT A/P Marked scrotal edema with a small right hydrocele and enlarged right, all cx NTD, doxy/zosyn per ID rec's - slow improvement....  HIV/GC/Chlam neg

## 2018-12-18 NOTE — PROGRESS NOTE ADULT - PROBLEM SELECTOR PLAN 1
Check cultures  Continue zosyn and Doxycycline  Medicine/ID recommendations appreciated  Continue Supportive care with scrotal elevation, ice packs, NSAIDS.

## 2018-12-19 LAB
BASOPHILS # BLD AUTO: 0.13 K/UL — SIGNIFICANT CHANGE UP (ref 0–0.2)
BASOPHILS NFR BLD AUTO: 0.5 % — SIGNIFICANT CHANGE UP (ref 0–2)
BASOPHILS NFR SPEC: 0 % — SIGNIFICANT CHANGE UP (ref 0–2)
BLASTS # FLD: 0 % — SIGNIFICANT CHANGE UP (ref 0–0)
EOSINOPHIL # BLD AUTO: 0.47 K/UL — SIGNIFICANT CHANGE UP (ref 0–0.5)
EOSINOPHIL NFR BLD AUTO: 1.7 % — SIGNIFICANT CHANGE UP (ref 0–6)
EOSINOPHIL NFR FLD: 0.9 % — SIGNIFICANT CHANGE UP (ref 0–6)
GIANT PLATELETS BLD QL SMEAR: PRESENT — SIGNIFICANT CHANGE UP
HCT VFR BLD CALC: 39.3 % — SIGNIFICANT CHANGE UP (ref 39–50)
HGB BLD-MCNC: 13 G/DL — SIGNIFICANT CHANGE UP (ref 13–17)
IMM GRANULOCYTES # BLD AUTO: 0.97 # — SIGNIFICANT CHANGE UP
IMM GRANULOCYTES NFR BLD AUTO: 3.6 % — HIGH (ref 0–1.5)
LYMPHOCYTES # BLD AUTO: 2.53 K/UL — SIGNIFICANT CHANGE UP (ref 1–3.3)
LYMPHOCYTES # BLD AUTO: 9.4 % — LOW (ref 13–44)
LYMPHOCYTES NFR SPEC AUTO: 2.6 % — LOW (ref 13–44)
MACROCYTES BLD QL: SLIGHT — SIGNIFICANT CHANGE UP
MCHC RBC-ENTMCNC: 29.3 PG — SIGNIFICANT CHANGE UP (ref 27–34)
MCHC RBC-ENTMCNC: 33.1 % — SIGNIFICANT CHANGE UP (ref 32–36)
MCV RBC AUTO: 88.7 FL — SIGNIFICANT CHANGE UP (ref 80–100)
METAMYELOCYTES # FLD: 0.9 % — SIGNIFICANT CHANGE UP (ref 0–1)
MICROCYTES BLD QL: SLIGHT — SIGNIFICANT CHANGE UP
MONOCYTES # BLD AUTO: 2.06 K/UL — HIGH (ref 0–0.9)
MONOCYTES NFR BLD AUTO: 7.7 % — SIGNIFICANT CHANGE UP (ref 2–14)
MONOCYTES NFR BLD: 4.4 % — SIGNIFICANT CHANGE UP (ref 2–9)
MYELOCYTES NFR BLD: 0 % — SIGNIFICANT CHANGE UP (ref 0–0)
NEUTROPHIL AB SER-ACNC: 90.3 % — HIGH (ref 43–77)
NEUTROPHILS # BLD AUTO: 20.7 K/UL — HIGH (ref 1.8–7.4)
NEUTROPHILS NFR BLD AUTO: 77.1 % — HIGH (ref 43–77)
NEUTS BAND # BLD: 0 % — SIGNIFICANT CHANGE UP (ref 0–6)
NRBC # FLD: 0 — SIGNIFICANT CHANGE UP
OTHER - HEMATOLOGY %: 0 — SIGNIFICANT CHANGE UP
PLATELET # BLD AUTO: 383 K/UL — SIGNIFICANT CHANGE UP (ref 150–400)
PLATELET COUNT - ESTIMATE: NORMAL — SIGNIFICANT CHANGE UP
PMV BLD: 10 FL — SIGNIFICANT CHANGE UP (ref 7–13)
PROMYELOCYTES # FLD: 0 % — SIGNIFICANT CHANGE UP (ref 0–0)
RBC # BLD: 4.43 M/UL — SIGNIFICANT CHANGE UP (ref 4.2–5.8)
RBC # FLD: 13.4 % — SIGNIFICANT CHANGE UP (ref 10.3–14.5)
VARIANT LYMPHS # BLD: 0.9 % — SIGNIFICANT CHANGE UP
WBC # BLD: 26.86 K/UL — HIGH (ref 3.8–10.5)
WBC # FLD AUTO: 26.86 K/UL — HIGH (ref 3.8–10.5)

## 2018-12-19 PROCEDURE — 74177 CT ABD & PELVIS W/CONTRAST: CPT | Mod: 26

## 2018-12-19 PROCEDURE — 99232 SBSQ HOSP IP/OBS MODERATE 35: CPT

## 2018-12-19 RX ADMIN — OXYCODONE HYDROCHLORIDE 15 MILLIGRAM(S): 5 TABLET ORAL at 23:42

## 2018-12-19 RX ADMIN — OXYCODONE HYDROCHLORIDE 15 MILLIGRAM(S): 5 TABLET ORAL at 18:15

## 2018-12-19 RX ADMIN — Medication 100 MILLIGRAM(S): at 06:35

## 2018-12-19 RX ADMIN — Medication 100 MILLIGRAM(S): at 22:36

## 2018-12-19 RX ADMIN — Medication 30 MILLIGRAM(S): at 11:24

## 2018-12-19 RX ADMIN — Medication 30 MILLIGRAM(S): at 17:30

## 2018-12-19 RX ADMIN — PIPERACILLIN AND TAZOBACTAM 25 GRAM(S): 4; .5 INJECTION, POWDER, LYOPHILIZED, FOR SOLUTION INTRAVENOUS at 06:35

## 2018-12-19 RX ADMIN — Medication 100 MILLIGRAM(S): at 09:18

## 2018-12-19 RX ADMIN — PIPERACILLIN AND TAZOBACTAM 25 GRAM(S): 4; .5 INJECTION, POWDER, LYOPHILIZED, FOR SOLUTION INTRAVENOUS at 22:36

## 2018-12-19 RX ADMIN — HEPARIN SODIUM 5000 UNIT(S): 5000 INJECTION INTRAVENOUS; SUBCUTANEOUS at 06:35

## 2018-12-19 RX ADMIN — OXYCODONE HYDROCHLORIDE 15 MILLIGRAM(S): 5 TABLET ORAL at 17:29

## 2018-12-19 RX ADMIN — HEPARIN SODIUM 5000 UNIT(S): 5000 INJECTION INTRAVENOUS; SUBCUTANEOUS at 17:30

## 2018-12-19 RX ADMIN — Medication 30 MILLIGRAM(S): at 06:35

## 2018-12-19 RX ADMIN — PIPERACILLIN AND TAZOBACTAM 25 GRAM(S): 4; .5 INJECTION, POWDER, LYOPHILIZED, FOR SOLUTION INTRAVENOUS at 14:49

## 2018-12-19 NOTE — PROGRESS NOTE ADULT - SUBJECTIVE AND OBJECTIVE BOX
Follow Up:  b/l epididymoorchitis    Interval History:  returned from CT.  no fever, chills.    ROS:    All other systems negative      Allergies  No Known Allergies        ANTIMICROBIALS: doxycycline hyclate Capsule 100 every 12 hours  piperacillin/tazobactam IVPB. 3.375 every 8 hours        OTHER MEDS:  docusate sodium 100 milliGRAM(s) Oral three times a day  heparin  Injectable 5000 Unit(s) SubCutaneous every 12 hours  lactated ringers. 1000 milliLiter(s) IV Continuous <Continuous>  morphine  - Injectable 4 milliGRAM(s) IV Push every 3 hours PRN  ondansetron Injectable 4 milliGRAM(s) IV Push every 6 hours PRN  oxyCODONE    IR 15 milliGRAM(s) Oral every 6 hours PRN  senna 2 Tablet(s) Oral at bedtime      Vital Signs Last 24 Hrs  T(F): 98.5 (12-19-18 @ 14:37), Max: 98.5 (12-18-18 @ 22:57)  HR: 73 (12-19-18 @ 14:37)  BP: 123/71 (12-19-18 @ 14:37)  RR: 18 (12-19-18 @ 14:37)  SpO2: 99% (12-19-18 @ 14:37) (95% - 99%)    Physical Exam:  General:   non toxic, comfortable  Head: atraumatic, normocephalic  Eye: normal sclera and conjunctiva  ENT:    no oropharyngeal lesions,  neck supple  Cardio:     regular S1, S2  Respiratory:    clear   abd:     soft,   BS +,   no tenderness  :   scrotum swelling decreased, firm right side, erythema less intense, skin with over scrotum desquamating  Musculoskeletal:   no joint swelling,   no edema  Neurologic:     no focal deficit  psych: normal affect                                                13.0   26.86 )-----------( 383      ( 19 Dec 2018 06:13 )             39.3 12-18    136  |  95  |  10  ----------------------------<  88  3.7   |  28  |  1.20  Ca    9.0      18 Dec 2018 05:26          MICROBIOLOGY:  BLOOD VENOUS  12-13-18 --  --  --  Culture - Blood (12.13.18 @ 12:25)    Culture - Blood:   NO ORGANISMS ISOLATED  NO ORGANISMS ISOLATED AT 96 HOURS    Specimen Source: BLOOD PERIPHERAL    Culture - Urine (12.13.18 @ 10:40)    Culture - Urine:   NO GROWTH AT 24 HOURS    Specimen Source: URINE MIDSTREAM        RADIOLOGY:    CT Abdomen and Pelvis w/ IV Cont (12.15.18 @ 09:39) >  Marked scrotal edema with a small right hydrocele and enlarged right   epididymis. Findings are consistent with history of epididymoorchitis.

## 2018-12-19 NOTE — PROGRESS NOTE ADULT - PROBLEM SELECTOR PLAN 1
sepsis d/t epididymo-orchitis - CT A/P Marked scrotal edema with a small right hydrocele and enlarged right, all cx NTD, doxy/zosyn per ID rec's - slow improvement, given persistent leukocytosis checking repeat imaging  HIV/GC/Chlam neg

## 2018-12-19 NOTE — PROGRESS NOTE ADULT - SUBJECTIVE AND OBJECTIVE BOX
Patient is a 38y old  Male who presents with a chief complaint of Epididimo-orchitis (19 Dec 2018 07:15)    SUBJECTIVE / OVERNIGHT EVENTS:  Wife at bedside.  Swelling about the same.  No fever or chills.  Otherwise eating normally, +BM, no dysuria.      MEDICATIONS  (STANDING):  docusate sodium 100 milliGRAM(s) Oral three times a day  doxycycline hyclate Capsule 100 milliGRAM(s) Oral every 12 hours  heparin  Injectable 5000 Unit(s) SubCutaneous every 12 hours  ketorolac   Injectable 30 milliGRAM(s) IV Push every 6 hours  piperacillin/tazobactam IVPB. 3.375 Gram(s) IV Intermittent every 8 hours  senna 2 Tablet(s) Oral at bedtime    MEDICATIONS  (PRN):  morphine  - Injectable 4 milliGRAM(s) IV Push every 3 hours PRN Severe Pain (7 - 10)  ondansetron Injectable 4 milliGRAM(s) IV Push every 6 hours PRN Nausea and/or Vomiting  oxyCODONE    IR 15 milliGRAM(s) Oral every 6 hours PRN Moderate Pain (4 - 6)    Vital Signs Last 24 Hrs  T(C): 36.7 (19 Dec 2018 09:17), Max: 36.9 (18 Dec 2018 22:57)  T(F): 98.1 (19 Dec 2018 09:17), Max: 98.5 (18 Dec 2018 22:57)  HR: 76 (19 Dec 2018 09:17) (76 - 87)  BP: 120/75 (19 Dec 2018 09:17) (106/62 - 134/78)  RR: 16 (19 Dec 2018 09:17) (16 - 18)  SpO2: 96% (19 Dec 2018 09:17) (95% - 98%)    PHYSICAL EXAM:  GENERAL: young WM, sitting in bed, NAD  HEAD:  Atraumatic, Normocephalic  EYES: EOMI, PERRLA, conjunctiva and sclera clear  NECK: Supple, No JVD  CHEST/LUNG: Clear to auscultation bilaterally; No wheeze  HEART: Regular rate and rhythm; No murmurs, rubs, or gallops  ABDOMEN: Soft, Nontender, Nondistended; Bowel sounds present  EXTREMITIES:  2+ Peripheral Pulses, No clubbing, cyanosis, or edema  PSYCH: AAOx3  NEUROLOGY: non-focal  SKIN: No rashes or lesions  : scrotum erythematous, swollen, tender    LABS:             13.0   26.86 )-----------( 383      ( 19 Dec 2018 06:13 )             39.3     136  |  95<L>  |  10  ----------------------------<  88  3.7   |  28  |  1.20    Ca    9.0      18 Dec 2018 05:26    RADIOLOGY & ADDITIONAL TESTS:    Imaging Personally Reviewed:    Consultant(s) Notes Reviewed:      Care Discussed with Consultants/Other Providers:  urology re overall care

## 2018-12-19 NOTE — PROGRESS NOTE ADULT - SUBJECTIVE AND OBJECTIVE BOX
Attending Note for Dr Mercer    Patient seen and examined  Repeat CT reviewed   No significant adenopathy  Scrotum not adequately visualized  wbc continues to be elevated  PE patient comfortable  Scrotum edematous bilaterally R>L  Right hemiscrotum firm indurated and nontender; mild desquamation of skin with mild erythema  Assessment: course is atypical and examination (nontender indurated mass with skin fixation) is worrisome for testicular abscess  DIscussed possibility with patient  Proceed with scrotal USG r/o abscess  Reviewed with house staff

## 2018-12-20 ENCOUNTER — INBOUND DOCUMENT (OUTPATIENT)
Age: 38
End: 2018-12-20

## 2018-12-20 ENCOUNTER — TRANSCRIPTION ENCOUNTER (OUTPATIENT)
Age: 38
End: 2018-12-20

## 2018-12-20 VITALS
DIASTOLIC BLOOD PRESSURE: 86 MMHG | RESPIRATION RATE: 16 BRPM | HEART RATE: 79 BPM | SYSTOLIC BLOOD PRESSURE: 150 MMHG | TEMPERATURE: 99 F | OXYGEN SATURATION: 98 %

## 2018-12-20 LAB
HCT VFR BLD CALC: 39.7 % — SIGNIFICANT CHANGE UP (ref 39–50)
HGB BLD-MCNC: 12.9 G/DL — LOW (ref 13–17)
MCHC RBC-ENTMCNC: 28.8 PG — SIGNIFICANT CHANGE UP (ref 27–34)
MCHC RBC-ENTMCNC: 32.5 % — SIGNIFICANT CHANGE UP (ref 32–36)
MCV RBC AUTO: 88.6 FL — SIGNIFICANT CHANGE UP (ref 80–100)
NRBC # FLD: 0 — SIGNIFICANT CHANGE UP
PLATELET # BLD AUTO: 416 K/UL — HIGH (ref 150–400)
PMV BLD: 9.9 FL — SIGNIFICANT CHANGE UP (ref 7–13)
RBC # BLD: 4.48 M/UL — SIGNIFICANT CHANGE UP (ref 4.2–5.8)
RBC # FLD: 13.3 % — SIGNIFICANT CHANGE UP (ref 10.3–14.5)
WBC # BLD: 22 K/UL — HIGH (ref 3.8–10.5)
WBC # FLD AUTO: 22 K/UL — HIGH (ref 3.8–10.5)

## 2018-12-20 PROCEDURE — 76870 US EXAM SCROTUM: CPT | Mod: 26

## 2018-12-20 PROCEDURE — 99232 SBSQ HOSP IP/OBS MODERATE 35: CPT

## 2018-12-20 RX ORDER — IBUPROFEN 200 MG
1 TABLET ORAL
Qty: 12 | Refills: 0 | OUTPATIENT
Start: 2018-12-20 | End: 2018-12-23

## 2018-12-20 RX ORDER — SENNA PLUS 8.6 MG/1
2 TABLET ORAL
Qty: 0 | Refills: 0 | COMMUNITY
Start: 2018-12-20

## 2018-12-20 RX ORDER — DOCUSATE SODIUM 100 MG
1 CAPSULE ORAL
Qty: 0 | Refills: 0 | COMMUNITY
Start: 2018-12-20

## 2018-12-20 RX ORDER — CIPROFLOXACIN LACTATE 400MG/40ML
1 VIAL (ML) INTRAVENOUS
Qty: 7 | Refills: 0 | OUTPATIENT
Start: 2018-12-20 | End: 2018-12-26

## 2018-12-20 RX ADMIN — PIPERACILLIN AND TAZOBACTAM 25 GRAM(S): 4; .5 INJECTION, POWDER, LYOPHILIZED, FOR SOLUTION INTRAVENOUS at 06:15

## 2018-12-20 RX ADMIN — Medication 30 MILLIGRAM(S): at 11:31

## 2018-12-20 RX ADMIN — Medication 100 MILLIGRAM(S): at 11:18

## 2018-12-20 RX ADMIN — HEPARIN SODIUM 5000 UNIT(S): 5000 INJECTION INTRAVENOUS; SUBCUTANEOUS at 06:14

## 2018-12-20 RX ADMIN — OXYCODONE HYDROCHLORIDE 15 MILLIGRAM(S): 5 TABLET ORAL at 00:20

## 2018-12-20 RX ADMIN — Medication 30 MILLIGRAM(S): at 06:14

## 2018-12-20 NOTE — DISCHARGE NOTE ADULT - MEDICATION SUMMARY - MEDICATIONS TO TAKE
I will START or STAY ON the medications listed below when I get home from the hospital:    Percocet 5/325 oral tablet  -- 2 tab(s) by mouth every 6 hours, As Needed for severe pain  MDD:8   -- Caution federal law prohibits the transfer of this drug to any person other  than the person for whom it was prescribed.  May cause drowsiness.  Alcohol may intensify this effect.  Use care when operating dangerous machinery.  This prescription cannot be refilled.  This product contains acetaminophen.  Do not use  with any other product containing acetaminophen to prevent possible liver damage.  Using more of this medication than prescribed may cause serious breathing problems.    -- Indication: For Scrotal pain    ibuprofen 600 mg oral tablet  -- 1 tab(s) by mouth every 8 hours, As Needed  for mild to mod pain MDD:3   -- Do not take this drug if you are pregnant.  It is very important that you take or use this exactly as directed.  Do not skip doses or discontinue unless directed by your doctor.  May cause drowsiness or dizziness.  Obtain medical advice before taking any non-prescription drugs as some may affect the action of this medication.  Take with food or milk.    -- Indication: For Scrotal pain    doxycycline monohydrate 100 mg oral capsule  -- 1 cap(s) by mouth every 12 hours  -- Indication: For Epididymo-orchitis    senna oral tablet  -- 2 tab(s) by mouth once a day (at bedtime)  -- Indication: For Epididymo-orchitis    docusate sodium 100 mg oral capsule  -- 1 cap(s) by mouth 3 times a day  -- Indication: For constipation     Levaquin 500 mg oral tablet  -- 1 tab(s) by mouth every 24 hours   -- Avoid prolonged or excessive exposure to direct and/or artificial sunlight while taking this medication.  Do not take dairy products, antacids, or iron preparations within one hour of this medication.  Finish all this medication unless otherwise directed by prescriber.  May cause drowsiness or dizziness.  Medication should be taken with plenty of water.    -- Indication: For constipation

## 2018-12-20 NOTE — PROGRESS NOTE ADULT - PROBLEM SELECTOR PROBLEM 2
Epididymo-orchitis
Leukocytosis, unspecified type
Epididymo-orchitis

## 2018-12-20 NOTE — PROGRESS NOTE ADULT - PROBLEM SELECTOR PROBLEM 1
Epididymo-orchitis
Sepsis, due to unspecified organism
Epididymo-orchitis
Sepsis, due to unspecified organism

## 2018-12-20 NOTE — DISCHARGE NOTE ADULT - MEDICATION SUMMARY - MEDICATIONS TO STOP TAKING
I will STOP taking the medications listed below when I get home from the hospital:    doxycycline hyclate 100 mg oral tablet  -- 1 tab(s) by mouth 2 times a day   -- Avoid prolonged or excessive exposure to direct and/or artificial sunlight while taking this medication.  Do not take this drug if you are pregnant.  Finish all this medication unless otherwise directed by prescriber.  Medication should be taken with plenty of water.

## 2018-12-20 NOTE — PROGRESS NOTE ADULT - REASON FOR ADMISSION
Epididimo-orchitis
Epididymo-orchitis
Epididymo-orchitis
Epididimo-orchitis

## 2018-12-20 NOTE — DISCHARGE NOTE ADULT - PATIENT PORTAL LINK FT
You can access the [facility] Patient Portal, offered by [facility], by registering with the following website: [email or web address]

## 2018-12-20 NOTE — PROGRESS NOTE ADULT - ATTENDING COMMENTS
Ct noted  Cont vanco/zosyn  Trend WBC  Cultures negative so far    Compa Leal  Attending Physician   Division of Infectious Disease  Pager #[phone]  After 5pm/weekend or no response, call #[phone]
Patient seen and examined. Afebrile. WBC stable at 26. Exam slightly improved. Appreciate ID input. Consider re-image in 24 hrs if we do not see further improvement.
Patient seen and examined. WBC trending down to 22. He was seen by Dr. Tucker (direct admit from last week) yesterday. Appeared swelling improved. Scrotal US ordered, no flow to right testicle (infarcted). After discussion with Dr. Tucker and ID, decision was for observation and abx as OP. They have opted not to have orhiectomy at this point, will follow up with Dr. Tucker next Thursday for repeat evaluation.
Patient seen and examined, agree with plan. Consult ID for abx recs pending cultures given elevated WBC 41.
b/l epididymo-orchitis. no signs of necrotizing fasciitis or abscess.  NSAIDS, scrotal elevation, abx recommended.

## 2018-12-20 NOTE — PROGRESS NOTE ADULT - PROBLEM SELECTOR PLAN 2
symptoms stable  voiding well  management per ID and Urology.
likely d/t above, f/u clinically
likely d/t above, not improving, repeat imaging planned today
likely d/t above, stabilizing...?if any further imaging indicated
symptoms stable  voiding well  management per ID and Urology.
likely d/t above, trending down, f/u

## 2018-12-20 NOTE — DISCHARGE NOTE ADULT - INSTRUCTIONS
Resume previous diet. Elevate scrotum. Continue antibiotics as ordered. Wear supportive undergarments. Elevate scrotum. Continue antibiotics as ordered.

## 2018-12-20 NOTE — DISCHARGE NOTE ADULT - CONDITIONS AT DISCHARGE
Vital signs are stable. Patient is alert and oriented. Scrotal swelling noted, stable. Patient has been medicated for pain with relief noted. Ambulatory ad aditi, tolerates diet.

## 2018-12-20 NOTE — PROGRESS NOTE ADULT - SUBJECTIVE AND OBJECTIVE BOX
Patient is a 38y old  Male who presents with a chief complaint of Epididimo-orchitis (20 Dec 2018 08:44)    SUBJECTIVE / OVERNIGHT EVENTS:  No complaints.  Feels less pain and redness.      MEDICATIONS  (STANDING):  docusate sodium 100 milliGRAM(s) Oral three times a day  doxycycline hyclate Capsule 100 milliGRAM(s) Oral every 12 hours  heparin  Injectable 5000 Unit(s) SubCutaneous every 12 hours  ketorolac   Injectable 30 milliGRAM(s) IV Push every 6 hours  piperacillin/tazobactam IVPB. 3.375 Gram(s) IV Intermittent every 8 hours  senna 2 Tablet(s) Oral at bedtime    MEDICATIONS  (PRN):  morphine  - Injectable 4 milliGRAM(s) IV Push every 3 hours PRN Severe Pain (7 - 10)  ondansetron Injectable 4 milliGRAM(s) IV Push every 6 hours PRN Nausea and/or Vomiting  oxyCODONE    IR 15 milliGRAM(s) Oral every 6 hours PRN Moderate Pain (4 - 6)    Vital Signs Last 24 Hrs  T(C): 36.8 (20 Dec 2018 06:10), Max: 37.3 (19 Dec 2018 17:08)  T(F): 98.3 (20 Dec 2018 06:10), Max: 99.2 (19 Dec 2018 17:08)  HR: 81 (20 Dec 2018 06:10) (72 - 81)  BP: 123/77 (20 Dec 2018 06:10) (121/57 - 146/75)  RR: 18 (20 Dec 2018 06:10) (18 - 18)  SpO2: 97% (20 Dec 2018 06:10) (93% - 99%)    PHYSICAL EXAM:  GENERAL: young WM, sitting in bed, NAD  HEAD:  Atraumatic, Normocephalic  EYES: EOMI, PERRLA, conjunctiva and sclera clear  NECK: Supple, No JVD  CHEST/LUNG: Clear to auscultation bilaterally; No wheeze  HEART: Regular rate and rhythm; No murmurs, rubs, or gallops  ABDOMEN: Soft, Nontender, Nondistended; Bowel sounds present  EXTREMITIES:  2+ Peripheral Pulses, No clubbing, cyanosis, or edema  PSYCH: AAOx3  NEUROLOGY: non-focal  SKIN: No rashes or lesions  : scrotum swollen    LABS:                        12.9   22.00 )-----------( 416      ( 20 Dec 2018 06:32 )             39.7     RADIOLOGY & ADDITIONAL TESTS:  < from: US Testicles (12.20.18 @ 09:06) >  RIGHT:    Right testis: 4.4 x 3.1 x 3.2 cm. Overall hypoechoic in appearance when   compared to the left. No blood flow identified within the right testis.  There is significant swelling of the right scrotal skin with reactive   flow.    Right hydrocele: Small septated hydrocele, likely reactive.    Right varicocele: None.      LEFT:     Left testis: 4.9 x 2.7 x 2.3 cm. Normal echogenicity and echotexture with   no masses or areas of architectural distortion. Normal blood flow pattern.    Left epididymis: Within normal limits.    Left hydrocele: None.    Left varicocele: None.    IMPRESSION:     Right testicular infarction.    Imaging Personally Reviewed:    Consultant(s) Notes Reviewed:      Care Discussed with Consultants/Other Providers:  urology re overall care

## 2018-12-20 NOTE — PROGRESS NOTE ADULT - PROVIDER SPECIALTY LIST ADULT
Hospitalist
Infectious Disease
Urology
Hospitalist

## 2018-12-20 NOTE — DISCHARGE NOTE ADULT - CARE PLAN
Principal Discharge DX:	Epididymo-orchitis  Goal:	recovery from Infection  Assessment and plan of treatment:	-Pt will continue antibiotics for ____ days as per the Infectious disease specialist  -Pt should continue scrotal support and precautions.  -Call office if you have fever >101,difficulty urinating ,Nausea, Vomiting, Inability to eat.  -Follow up with Dr. Tucker in 1 week (12/27) for follow up on your progress and to discuss surgical removal of   your testicle.

## 2018-12-20 NOTE — PROGRESS NOTE ADULT - PROBLEM SELECTOR PLAN 1
AM CBC reviewed  Cont zosyn/doxy  ID f/u, hospitalist  Scrotal elevation/ice  F/U U/S  DVT prophy  OOB

## 2018-12-20 NOTE — DISCHARGE NOTE ADULT - NS AS ACTIVITY OBS
No Heavy lifting/straining/Walking-Indoors allowed/Stairs allowed/do not drive if on Narcotic pain meds/Showering allowed/Do not drive or operate machinery/Walking-Outdoors allowed

## 2018-12-20 NOTE — DISCHARGE NOTE ADULT - CARE PROVIDERS DIRECT ADDRESSES
[email or web address]

## 2018-12-20 NOTE — PROGRESS NOTE ADULT - PROBLEM SELECTOR PLAN 1
sepsis d/t epididymo-orchitis - CT A/P Marked scrotal edema with a small right hydrocele and enlarged right, all cx NTD, doxy/zosyn per ID rec's - slow improvement, given persistent leukocytosis checking repeat imaging --> US shows R testicular infarction --> possible surgery per   HIV/GC/Chlam neg

## 2018-12-20 NOTE — DISCHARGE NOTE ADULT - CARE PROVIDER_API CALL
Noreen Chavez), Infectious Disease; Internal Medicine  [address] [place] [address]  Phone: [phone]  Fax: [phone]    Marcell Tucker), Urology  [address] [place] [address]  Phone: [phone]  Fax: [phone]

## 2018-12-20 NOTE — DISCHARGE NOTE ADULT - HOSPITAL COURSE
38y male admitted on 12/13 with epididymoorchitis and fevers to 103.  During hospitalization he was seen by ID and treated initially with Zosyn and Vanco and then changed to Zosyn and Doxycycline.  His urine and blood cultures remained negative.  His CT scan on 12/15 confirmed  epididymoorchitis.  His WBC's continued to trend down from 40k to 22k on day of discharge.  A follow up CT scan on 12/19 revealed a R sided hydrocele with enhancement and a hypo enhancing R testicle.  A confirmatory scrotal US was done on 12/20 which revealed an infarcted R testicle.  As per pt's improved cli 38y male admitted on 12/13 with epididymoorchitis and fevers to 103.  During hospitalization he was seen by ID and treated initially with Zosyn and Vanco and then changed to Zosyn and Doxycycline.  His urine and blood cultures remained negative.  His CT scan on 12/15 confirmed  epididymoorchitis.  His WBC's continued to trend down from 40k to 22k on day of discharge.  A follow up CT scan on 12/19 revealed a R sided hydrocele with enhancement and a hypo enhancing R testicle.  A confirmatory scrotal US was done on 12/20 which revealed an infarcted R testicle.  As per pt's improved clinical condition he will be d/c'ed on a 7 day course of Levaquin and Doxycycline and follow up in 1 week with Dr. Tucker.  [phone]

## 2018-12-20 NOTE — PROGRESS NOTE ADULT - SUBJECTIVE AND OBJECTIVE BOX
Overnight events:  None, remained afebrile    Subjective:  Pt offers no complaints    Objective:    Vital signs  T(C): , Max: 37.3 (12-19-18 @ 17:08)  HR: 81 (12-20-18 @ 06:10)  BP: 123/77 (12-20-18 @ 06:10)  SpO2: 97% (12-20-18 @ 06:10)  Wt(kg): --    Output   Void: 2 not saved      Gen: NAD  Abd: soft, nontender  : Scrotum edematous bilaterally R>L Right hemiscrotum firm indurated and nontender; mild desquamation of skin with mild erythema, no crepitus, no increased erythema of suprapubic area      Labs                        12.9   22.00 )-----------( 416      ( 20 Dec 2018 06:32 )             39.7             Urine Cx: neg    Blood Cx: neg    Imaging:   CT Abdomen and Pelvis w/ IV Cont (12.19.18 @ 13:17) >  MPRESSION:   *  Small right-sided hydrocele, now with peripheral enhancement,   suggesting the presence of superimposed infection.  *  The right testicle is notdiscretely visualized on the current   examination and may be hypoenhancing. Correlate with scrotal ultrasound.  *  No evidence of intra-abdominal or intrapelvic abscess.

## 2018-12-21 ENCOUNTER — CHART COPY (OUTPATIENT)
Age: 38
End: 2018-12-21

## 2018-12-22 ENCOUNTER — MOBILE ON CALL (OUTPATIENT)
Age: 38
End: 2018-12-22

## 2018-12-27 ENCOUNTER — LABORATORY RESULT (OUTPATIENT)
Age: 38
End: 2018-12-27

## 2018-12-27 ENCOUNTER — APPOINTMENT (OUTPATIENT)
Dept: UROLOGY | Facility: CLINIC | Age: 38
End: 2018-12-27
Payer: COMMERCIAL

## 2018-12-27 VITALS
HEART RATE: 95 BPM | DIASTOLIC BLOOD PRESSURE: 72 MMHG | OXYGEN SATURATION: 96 % | TEMPERATURE: 98.2 F | SYSTOLIC BLOOD PRESSURE: 111 MMHG

## 2018-12-27 PROCEDURE — 99213 OFFICE O/P EST LOW 20 MIN: CPT

## 2018-12-28 LAB
BASOPHILS # BLD AUTO: 0.05 K/UL
BASOPHILS NFR BLD AUTO: 0.4 %
EOSINOPHIL # BLD AUTO: 0.14 K/UL
EOSINOPHIL NFR BLD AUTO: 1 %
HCT VFR BLD CALC: 48.3 %
HGB BLD-MCNC: 15.8 G/DL
IMM GRANULOCYTES NFR BLD AUTO: 0.4 %
LYMPHOCYTES # BLD AUTO: 3.97 K/UL
LYMPHOCYTES NFR BLD AUTO: 28.5 %
MAN DIFF?: NORMAL
MCHC RBC-ENTMCNC: 29.5 PG
MCHC RBC-ENTMCNC: 32.7 GM/DL
MCV RBC AUTO: 90.1 FL
MONOCYTES # BLD AUTO: 0.94 K/UL
MONOCYTES NFR BLD AUTO: 6.7 %
NEUTROPHILS # BLD AUTO: 8.79 K/UL
NEUTROPHILS NFR BLD AUTO: 63 %
PLATELET # BLD AUTO: 576 K/UL
RBC # BLD: 5.36 M/UL
RBC # FLD: 13.8 %
WBC # FLD AUTO: 13.95 K/UL

## 2019-01-03 ENCOUNTER — APPOINTMENT (OUTPATIENT)
Dept: UROLOGY | Facility: CLINIC | Age: 39
End: 2019-01-03
Payer: COMMERCIAL

## 2019-01-03 ENCOUNTER — LABORATORY RESULT (OUTPATIENT)
Age: 39
End: 2019-01-03

## 2019-01-03 PROCEDURE — 99213 OFFICE O/P EST LOW 20 MIN: CPT

## 2019-01-03 RX ORDER — CEPHALEXIN 500 MG/1
500 CAPSULE ORAL
Qty: 28 | Refills: 0 | Status: ACTIVE | COMMUNITY
Start: 2018-12-28 | End: 1900-01-01

## 2019-01-03 RX ORDER — DOXYCYCLINE HYCLATE 100 MG/1
100 TABLET ORAL
Qty: 20 | Refills: 0 | Status: ACTIVE | COMMUNITY
Start: 2018-12-28 | End: 1900-01-01

## 2019-01-04 LAB
BASOPHILS # BLD AUTO: 0.08 K/UL
BASOPHILS NFR BLD AUTO: 0.8 %
EOSINOPHIL # BLD AUTO: 0.17 K/UL
EOSINOPHIL NFR BLD AUTO: 1.6 %
HCT VFR BLD CALC: 46.2 %
HGB BLD-MCNC: 15 G/DL
IMM GRANULOCYTES NFR BLD AUTO: 0.2 %
LYMPHOCYTES # BLD AUTO: 3.05 K/UL
LYMPHOCYTES NFR BLD AUTO: 29 %
MAN DIFF?: NORMAL
MCHC RBC-ENTMCNC: 29.6 PG
MCHC RBC-ENTMCNC: 32.5 GM/DL
MCV RBC AUTO: 91.1 FL
MONOCYTES # BLD AUTO: 0.89 K/UL
MONOCYTES NFR BLD AUTO: 8.5 %
NEUTROPHILS # BLD AUTO: 6.32 K/UL
NEUTROPHILS NFR BLD AUTO: 59.9 %
PLATELET # BLD AUTO: 473 K/UL
RBC # BLD: 5.07 M/UL
RBC # FLD: 13.6 %
WBC # FLD AUTO: 10.53 K/UL

## 2019-01-16 ENCOUNTER — APPOINTMENT (OUTPATIENT)
Dept: UROLOGY | Facility: CLINIC | Age: 39
End: 2019-01-16
Payer: COMMERCIAL

## 2019-01-16 ENCOUNTER — INPATIENT (INPATIENT)
Facility: HOSPITAL | Age: 39
LOS: 0 days | Discharge: HOME | End: 2019-01-17
Attending: UROLOGY | Admitting: UROLOGY
Payer: COMMERCIAL

## 2019-01-16 ENCOUNTER — TRANSCRIPTION ENCOUNTER (OUTPATIENT)
Age: 39
End: 2019-01-16

## 2019-01-16 VITALS
SYSTOLIC BLOOD PRESSURE: 126 MMHG | TEMPERATURE: 98 F | DIASTOLIC BLOOD PRESSURE: 79 MMHG | HEART RATE: 82 BPM | OXYGEN SATURATION: 99 % | HEIGHT: 71 IN | WEIGHT: 289.91 LBS | RESPIRATION RATE: 18 BRPM

## 2019-01-16 VITALS
HEART RATE: 87 BPM | OXYGEN SATURATION: 98 % | SYSTOLIC BLOOD PRESSURE: 131 MMHG | TEMPERATURE: 97.7 F | DIASTOLIC BLOOD PRESSURE: 87 MMHG

## 2019-01-16 DIAGNOSIS — N45.4 ABSCESS OF EPIDIDYMIS OR TESTIS: ICD-10-CM

## 2019-01-16 DIAGNOSIS — N49.2 INFLAMMATORY DISORDERS OF SCROTUM: ICD-10-CM

## 2019-01-16 DIAGNOSIS — Z98.890 OTHER SPECIFIED POSTPROCEDURAL STATES: Chronic | ICD-10-CM

## 2019-01-16 LAB
ANION GAP SERPL CALC-SCNC: 13 MEQ/L — SIGNIFICANT CHANGE UP (ref 7–14)
APTT BLD: 33.7 SEC — SIGNIFICANT CHANGE UP (ref 27.5–36.3)
BASOPHILS # BLD AUTO: 0.07 K/UL — SIGNIFICANT CHANGE UP (ref 0–0.2)
BASOPHILS NFR BLD AUTO: 0.5 % — SIGNIFICANT CHANGE UP (ref 0–2)
BLD GP AB SCN SERPL QL: NEGATIVE — SIGNIFICANT CHANGE UP
BUN SERPL-MCNC: 13 MG/DL — SIGNIFICANT CHANGE UP (ref 7–23)
CALCIUM SERPL-MCNC: 9.6 MG/DL — SIGNIFICANT CHANGE UP (ref 8.4–10.5)
CHLORIDE SERPL-SCNC: 99 MMOL/L — SIGNIFICANT CHANGE UP (ref 98–107)
CO2 SERPL-SCNC: 27 MMOL/L — SIGNIFICANT CHANGE UP (ref 22–31)
CREAT SERPL-MCNC: 1.04 MG/DL — SIGNIFICANT CHANGE UP (ref 0.5–1.3)
EOSINOPHIL # BLD AUTO: 0.23 K/UL — SIGNIFICANT CHANGE UP (ref 0–0.5)
EOSINOPHIL NFR BLD AUTO: 1.5 % — SIGNIFICANT CHANGE UP (ref 0–6)
GLUCOSE SERPL-MCNC: 91 MG/DL — SIGNIFICANT CHANGE UP (ref 70–99)
HCT VFR BLD CALC: 44.9 % — SIGNIFICANT CHANGE UP (ref 39–50)
HGB BLD-MCNC: 14.1 G/DL — SIGNIFICANT CHANGE UP (ref 13–17)
IMM GRANULOCYTES NFR BLD AUTO: 0.5 % — SIGNIFICANT CHANGE UP (ref 0–1.5)
INR BLD: 1.13 — SIGNIFICANT CHANGE UP (ref 0.88–1.17)
LYMPHOCYTES # BLD AUTO: 20.1 % — SIGNIFICANT CHANGE UP (ref 13–44)
LYMPHOCYTES # BLD AUTO: 3.06 K/UL — SIGNIFICANT CHANGE UP (ref 1–3.3)
MCHC RBC-ENTMCNC: 28.4 PG — SIGNIFICANT CHANGE UP (ref 27–34)
MCHC RBC-ENTMCNC: 31.4 % — LOW (ref 32–36)
MCV RBC AUTO: 90.3 FL — SIGNIFICANT CHANGE UP (ref 80–100)
MONOCYTES # BLD AUTO: 1.26 K/UL — HIGH (ref 0–0.9)
MONOCYTES NFR BLD AUTO: 8.3 % — SIGNIFICANT CHANGE UP (ref 2–14)
NEUTROPHILS # BLD AUTO: 10.55 K/UL — HIGH (ref 1.8–7.4)
NEUTROPHILS NFR BLD AUTO: 69.1 % — SIGNIFICANT CHANGE UP (ref 43–77)
NRBC # FLD: 0 K/UL — LOW (ref 25–125)
PLATELET # BLD AUTO: 311 K/UL — SIGNIFICANT CHANGE UP (ref 150–400)
PMV BLD: 10.7 FL — SIGNIFICANT CHANGE UP (ref 7–13)
POTASSIUM SERPL-MCNC: 5 MMOL/L — SIGNIFICANT CHANGE UP (ref 3.5–5.3)
POTASSIUM SERPL-SCNC: 5 MMOL/L — SIGNIFICANT CHANGE UP (ref 3.5–5.3)
PROTHROM AB SERPL-ACNC: 12.9 SEC — SIGNIFICANT CHANGE UP (ref 9.8–13.1)
RBC # BLD: 4.97 M/UL — SIGNIFICANT CHANGE UP (ref 4.2–5.8)
RBC # FLD: 13.1 % — SIGNIFICANT CHANGE UP (ref 10.3–14.5)
RH IG SCN BLD-IMP: POSITIVE — SIGNIFICANT CHANGE UP
RH IG SCN BLD-IMP: POSITIVE — SIGNIFICANT CHANGE UP
SODIUM SERPL-SCNC: 139 MMOL/L — SIGNIFICANT CHANGE UP (ref 135–145)
WBC # BLD: 15.24 K/UL — HIGH (ref 3.8–10.5)
WBC # FLD AUTO: 15.24 K/UL — HIGH (ref 3.8–10.5)

## 2019-01-16 PROCEDURE — 99214 OFFICE O/P EST MOD 30 MIN: CPT

## 2019-01-16 PROCEDURE — 71045 X-RAY EXAM CHEST 1 VIEW: CPT | Mod: 26,76

## 2019-01-16 RX ORDER — OXYCODONE HYDROCHLORIDE 5 MG/1
10 TABLET ORAL EVERY 6 HOURS
Qty: 0 | Refills: 0 | Status: DISCONTINUED | OUTPATIENT
Start: 2019-01-16 | End: 2019-01-17

## 2019-01-16 RX ORDER — DOCUSATE SODIUM 100 MG
100 CAPSULE ORAL THREE TIMES A DAY
Qty: 0 | Refills: 0 | Status: DISCONTINUED | OUTPATIENT
Start: 2019-01-16 | End: 2019-01-17

## 2019-01-16 RX ORDER — HEPARIN SODIUM 5000 [USP'U]/ML
5000 INJECTION INTRAVENOUS; SUBCUTANEOUS EVERY 12 HOURS
Qty: 0 | Refills: 0 | Status: DISCONTINUED | OUTPATIENT
Start: 2019-01-16 | End: 2019-01-17

## 2019-01-16 RX ORDER — PIPERACILLIN AND TAZOBACTAM 4; .5 G/20ML; G/20ML
3.38 INJECTION, POWDER, LYOPHILIZED, FOR SOLUTION INTRAVENOUS EVERY 8 HOURS
Qty: 0 | Refills: 0 | Status: DISCONTINUED | OUTPATIENT
Start: 2019-01-16 | End: 2019-01-17

## 2019-01-16 RX ORDER — SODIUM CHLORIDE 9 MG/ML
1000 INJECTION, SOLUTION INTRAVENOUS
Qty: 0 | Refills: 0 | Status: DISCONTINUED | OUTPATIENT
Start: 2019-01-16 | End: 2019-01-17

## 2019-01-16 RX ORDER — PIPERACILLIN AND TAZOBACTAM 4; .5 G/20ML; G/20ML
3.38 INJECTION, POWDER, LYOPHILIZED, FOR SOLUTION INTRAVENOUS ONCE
Qty: 0 | Refills: 0 | Status: COMPLETED | OUTPATIENT
Start: 2019-01-16 | End: 2019-01-16

## 2019-01-16 RX ORDER — ACETAMINOPHEN 500 MG
650 TABLET ORAL EVERY 6 HOURS
Qty: 0 | Refills: 0 | Status: DISCONTINUED | OUTPATIENT
Start: 2019-01-16 | End: 2019-01-17

## 2019-01-16 RX ORDER — HYDROMORPHONE HYDROCHLORIDE 2 MG/ML
0.5 INJECTION INTRAMUSCULAR; INTRAVENOUS; SUBCUTANEOUS EVERY 4 HOURS
Qty: 0 | Refills: 0 | Status: DISCONTINUED | OUTPATIENT
Start: 2019-01-16 | End: 2019-01-17

## 2019-01-16 RX ORDER — SENNA PLUS 8.6 MG/1
2 TABLET ORAL AT BEDTIME
Qty: 0 | Refills: 0 | Status: DISCONTINUED | OUTPATIENT
Start: 2019-01-16 | End: 2019-01-17

## 2019-01-16 RX ADMIN — OXYCODONE HYDROCHLORIDE 10 MILLIGRAM(S): 5 TABLET ORAL at 20:45

## 2019-01-16 RX ADMIN — PIPERACILLIN AND TAZOBACTAM 25 GRAM(S): 4; .5 INJECTION, POWDER, LYOPHILIZED, FOR SOLUTION INTRAVENOUS at 23:12

## 2019-01-16 RX ADMIN — SODIUM CHLORIDE 75 MILLILITER(S): 9 INJECTION, SOLUTION INTRAVENOUS at 21:48

## 2019-01-16 RX ADMIN — PIPERACILLIN AND TAZOBACTAM 200 GRAM(S): 4; .5 INJECTION, POWDER, LYOPHILIZED, FOR SOLUTION INTRAVENOUS at 15:28

## 2019-01-16 RX ADMIN — HEPARIN SODIUM 5000 UNIT(S): 5000 INJECTION INTRAVENOUS; SUBCUTANEOUS at 21:47

## 2019-01-16 RX ADMIN — Medication 100 MILLIGRAM(S): at 21:47

## 2019-01-16 RX ADMIN — SODIUM CHLORIDE 75 MILLILITER(S): 9 INJECTION, SOLUTION INTRAVENOUS at 15:28

## 2019-01-16 RX ADMIN — OXYCODONE HYDROCHLORIDE 10 MILLIGRAM(S): 5 TABLET ORAL at 21:45

## 2019-01-16 RX ADMIN — SENNA PLUS 2 TABLET(S): 8.6 TABLET ORAL at 21:47

## 2019-01-16 NOTE — H&P ADULT - NSHPPHYSICALEXAM_GEN_ALL_CORE
PHYSICAL EXAM:      Constitutional: Awake alert NAD    Eyes: Conjunctiva clear    ENMT: Mouth Moist; No nasal discharge    Neck: supple , No JVD    Breasts: not examined    Back:  CVAT  [  ]Y   [ x ]N    Respiratory: No Resp Distress    Cardiovascular: Reg Rate and Rhythm    Gastrointestinal: Soft, NT, ND, No guarding    Genitourinary: Penis - WNL  ,  Scrotum (+) erythema, swelling, tenderness of R hemiscrotum    Extremities: AROM x 4    Vascular: Radial Pulse Reg Rate & Rhythm    Neurological: No focal Defecits, A &O x3    Skin: No rash    Lymph Nodes: No Lymphadenopathy    Musculoskeletal: Strength Intact    Psychiatric: Cooperative, No  Psychosis No Aggression

## 2019-01-16 NOTE — ASSESSMENT
[FreeTextEntry1] : Extensive discussion with patient and wife\par Discussed concern for scrotal skin/phlegmon and  infection\par USG did not demonstrate flow although no clear collection\par Patient is now willing to proceed with orchiectomy\par "i am OK with it...I have another testicle"\par Discussed prosthesis at later date...not interested\par Prefers to proceed with orchiectomy \par Discussed probable drain placement\par Patient wants to be certain that I am performing surgery\par Appreciative of care

## 2019-01-16 NOTE — PHYSICAL EXAM
[Bowel Sounds] : normal bowel sounds [Abdomen Soft] : soft [Abdomen Tenderness] : non-tender [] : no hepato-splenomegaly [FreeTextEntry1] : scrotal edema and erythema; an area of fluctuance superior aspect of scrotum with erythema and tenderness;  edema of redundant prepuce; left testicle palpable and non tender

## 2019-01-16 NOTE — HISTORY OF PRESENT ILLNESS
[Currently Experiencing ___] :  [unfilled] [FreeTextEntry1] : 41 year old  who presented to ED and was diagnosed with acute epididymo-orchitis with marked elevation of WBC, with a shit and fevers and chills with an ultrasound which was diagnostic of acute epididymoorchitis with marked increased flow of epididymis and testicle.\par He was hospitalized and seen by ID and managed by Dr Mercer. When he had persistence of wbc elevation a repeat usg demonstrated no flow. He was counseled re orchiectomy but chose to observe and was sent home. \par He returns with his wife today.\par He states he feels well\par He has not had fever since the Saturday of hospitalization is in no discomfort \par  \par 1.16.2019\par patient returns after conservative management\par he had normalization of wbc and remained afebrile\par he had decreasing swelling and discomfort but now is complaining of increasing discomfort and swelling [Erectile Dysfunction] : no Erectile Dysfunction

## 2019-01-16 NOTE — H&P ADULT - ASSESSMENT
38y male with R epididimorchitis, R infarcted testicle,  and possible R testicle abscess admitted for R orchiectomy

## 2019-01-16 NOTE — H&P ADULT - ATTENDING COMMENTS
I have reviewed with patient wife and mother  Patient reexamined with fluctuant anterior aspect of right hemisrotum  Plan on orchiectomy in am in light OR delay  Discussed procedure, drains recovery; will not consider prosthesis or contralateral orchiopexy and discussed with patient and family  On Zosyn  Elevated wbc noted

## 2019-01-16 NOTE — H&P ADULT - HISTORY OF PRESENT ILLNESS
Pt diagnosed and treated at [facility] last month for epididimo-orchitis.  During admission he had a scrotal sono which revealed an infarcted Right testicle.  Pt was d/c'ed home on a prolonged antibiotic course.  His symptoms improved and he returned to work last week.  However by this past weekend he noted increased pain and swelling.  Pt was seen today by Dr. Tucker and had a scrotal sono.  The results were concerning for abscess and he was sent to [facility] for direct admission and R Orchectomy this evening.    Denies any fever, chills, N/V, dysuria.

## 2019-01-17 ENCOUNTER — INBOUND DOCUMENT (OUTPATIENT)
Age: 39
End: 2019-01-17

## 2019-01-17 ENCOUNTER — RESULT REVIEW (OUTPATIENT)
Age: 39
End: 2019-01-17

## 2019-01-17 ENCOUNTER — TRANSCRIPTION ENCOUNTER (OUTPATIENT)
Age: 39
End: 2019-01-17

## 2019-01-17 VITALS
HEART RATE: 88 BPM | SYSTOLIC BLOOD PRESSURE: 134 MMHG | OXYGEN SATURATION: 95 % | RESPIRATION RATE: 16 BRPM | TEMPERATURE: 97 F | DIASTOLIC BLOOD PRESSURE: 81 MMHG

## 2019-01-17 LAB
GRAM STN SPEC: SIGNIFICANT CHANGE UP
SPECIMEN SOURCE: SIGNIFICANT CHANGE UP

## 2019-01-17 PROCEDURE — 54520 REMOVAL OF TESTIS: CPT | Mod: RT

## 2019-01-17 PROCEDURE — 88305 TISSUE EXAM BY PATHOLOGIST: CPT | Mod: 26

## 2019-01-17 RX ORDER — CEPHALEXIN 500 MG
1 CAPSULE ORAL
Qty: 28 | Refills: 0 | OUTPATIENT
Start: 2019-01-17 | End: 2019-01-23

## 2019-01-17 RX ORDER — ACETAMINOPHEN 500 MG
2 TABLET ORAL
Qty: 0 | Refills: 0 | COMMUNITY
Start: 2019-01-17

## 2019-01-17 RX ADMIN — OXYCODONE HYDROCHLORIDE 10 MILLIGRAM(S): 5 TABLET ORAL at 04:36

## 2019-01-17 RX ADMIN — OXYCODONE HYDROCHLORIDE 10 MILLIGRAM(S): 5 TABLET ORAL at 11:23

## 2019-01-17 RX ADMIN — HYDROMORPHONE HYDROCHLORIDE 0.5 MILLIGRAM(S): 2 INJECTION INTRAMUSCULAR; INTRAVENOUS; SUBCUTANEOUS at 08:35

## 2019-01-17 RX ADMIN — PIPERACILLIN AND TAZOBACTAM 25 GRAM(S): 4; .5 INJECTION, POWDER, LYOPHILIZED, FOR SOLUTION INTRAVENOUS at 08:06

## 2019-01-17 RX ADMIN — SODIUM CHLORIDE 75 MILLILITER(S): 9 INJECTION, SOLUTION INTRAVENOUS at 08:23

## 2019-01-17 RX ADMIN — HYDROMORPHONE HYDROCHLORIDE 0.5 MILLIGRAM(S): 2 INJECTION INTRAMUSCULAR; INTRAVENOUS; SUBCUTANEOUS at 08:50

## 2019-01-17 NOTE — DISCHARGE NOTE ADULT - INSTRUCTIONS
Regular diet WOUND CARE: Penrose drain will be removed at follow-up visit tomorrow friday 1/18.  Please keep dressing clean until follow up visit.  Use compression to keep dressing intact (mesh underwear, scrotal support).   BATHING: Please do not submerge wound underwater. You may shower and/or sponge bathe.  ACTIVITY: No heavy lifting anything more than 10-15lbs or straining. Otherwise, you may return to your usual level of physical activity. If you are taking narcotic pain medication (such as Percocet), do NOT drive a car, operate machinery or make important decisions.  NOTIFY YOUR SURGEON IF: You have any bleeding that does not stop, any pus draining from your wound, any fever (over 100.4 F) or chills, persistent nausea/vomiting with inability to tolerate food or liquids, persistent diarrhea, or if your pain is not controlled on your discharge pain medications.  FOLLOW-UP:  1. Please call to make a follow-up with Dr. Mercer for Friday 1/18 in the office for a wound check and drain removal.   2. Please follow up with your primary care physician in one week regarding your hospitalization.  3. Please take antibiotics for one week.

## 2019-01-17 NOTE — DISCHARGE NOTE ADULT - CARE PLAN
Principal Discharge DX:	Scrotal abscess  Goal:	Follow up  Assessment and plan of treatment:	WOUND CARE: Penrose drain will be removed at follow-up visit.  Please keep dressing clean until follow up visit.  Use compression to keep dressing intact (mesh underwear, scrotal support).   BATHING: Please do not submerge wound underwater. You may shower and/or sponge bathe.  ACTIVITY: No heavy lifting anything more than 10-15lbs or straining. Otherwise, you may return to your usual level of physical activity. If you are taking narcotic pain medication (such as Percocet), do NOT drive a car, operate machinery or make important decisions.  DIET: Regular diet  NOTIFY YOUR SURGEON IF: You have any bleeding that does not stop, any pus draining from your wound, any fever (over 100.4 F) or chills, persistent nausea/vomiting with inability to tolerate food or liquids, persistent diarrhea, or if your pain is not controlled on your discharge pain medications.  FOLLOW-UP:  1. Please call to make a follow-up with Dr. Mercer in the office for a wound check and drain removal.   2. Please follow up with your primary care physician in one week regarding your hospitalization.  3. Please take antibiotics for one week.

## 2019-01-17 NOTE — DISCHARGE NOTE ADULT - MEDICATION SUMMARY - MEDICATIONS TO STOP TAKING
I will STOP taking the medications listed below when I get home from the hospital:    doxycycline monohydrate 100 mg oral capsule  -- 1 cap(s) by mouth every 12 hours    Levaquin 500 mg oral tablet  -- 1 tab(s) by mouth every 24 hours   -- Avoid prolonged or excessive exposure to direct and/or artificial sunlight while taking this medication.  Do not take dairy products, antacids, or iron preparations within one hour of this medication.  Finish all this medication unless otherwise directed by prescriber.  May cause drowsiness or dizziness.  Medication should be taken with plenty of water.

## 2019-01-17 NOTE — DISCHARGE NOTE ADULT - PATIENT PORTAL LINK FT
You can access the [facility] Patient Portal, offered by [facility], by registering with the following website: [email or web address]

## 2019-01-17 NOTE — BRIEF OPERATIVE NOTE - PRE-OP DX
Scrotal abscess  01/17/2019    Active  Maxx Gustafson  Testicular infarct, right  01/17/2019    Active  Maxx Gustafson

## 2019-01-17 NOTE — DISCHARGE NOTE ADULT - CARE PROVIDER_API CALL
Marcell Tucker (MD), Urology  [address] [address]  Phone: [phone]  Fax: [phone]

## 2019-01-17 NOTE — BRIEF OPERATIVE NOTE - PROCEDURE
<<-----Click on this checkbox to enter Procedure Orchiectomy by scrotal approach in adult  01/17/2019    Active  VILMA

## 2019-01-17 NOTE — DISCHARGE NOTE ADULT - HOSPITAL COURSE
Pt diagnosed and treated at [facility] last month for epididimo-orchitis.  During admission he had a scrotal sono which revealed an infarcted Right testicle.  Pt was d/c'ed home on a prolonged antibiotic course.  His symptoms improved and he returned to work last week.  However by this past weekend he noted increased pain and swelling.  Pt was seen today by Dr. Tucker and had a scrotal sono.  The results were concerning for abscess and he was sent to [facility] for direct admission.    Patient underwent R. orchiectomy on 1/17, no complications.  Patient instructed to f/u with Dr. Mercer in office for drain removal and abx for one week.

## 2019-01-17 NOTE — DISCHARGE NOTE ADULT - MEDICATION SUMMARY - MEDICATIONS TO TAKE
I will START or STAY ON the medications listed below when I get home from the hospital:    ibuprofen 600 mg oral tablet  -- 1 tab(s) by mouth every 8 hours, As Needed  for mild to mod pain MDD:3   -- Do not take this drug if you are pregnant.  It is very important that you take or use this exactly as directed.  Do not skip doses or discontinue unless directed by your doctor.  May cause drowsiness or dizziness.  Obtain medical advice before taking any non-prescription drugs as some may affect the action of this medication.  Take with food or milk.    -- Indication: For Pain    acetaminophen 325 mg oral tablet  -- 2 tab(s) by mouth every 6 hours, As needed, Mild Pain (1 - 3)  -- Indication: For Pain    Percocet 5/325 oral tablet  -- 1 tab(s) by mouth every 4 hours MDD:5  -- Caution federal law prohibits the transfer of this drug to any person other  than the person for whom it was prescribed.  May cause drowsiness.  Alcohol may intensify this effect.  Use care when operating dangerous machinery.  This prescription cannot be refilled.  This product contains acetaminophen.  Do not use  with any other product containing acetaminophen to prevent possible liver damage.  Using more of this medication than prescribed may cause serious breathing problems.    -- Indication: For Pain    Keflex 500 mg oral capsule  -- 1 cap(s) by mouth 4 times a day   -- Finish all this medication unless otherwise directed by prescriber.    -- Indication: For Antibiotics    senna oral tablet  -- 2 tab(s) by mouth once a day (at bedtime)  -- Indication: For Constipation    docusate sodium 100 mg oral capsule  -- 1 cap(s) by mouth 3 times a day  -- Indication: For Constipation

## 2019-01-17 NOTE — DISCHARGE NOTE ADULT - NS AS ACTIVITY OBS
while taking pain medications/Do not make important decisions/No Heavy lifting/straining/Do not drive or operate machinery

## 2019-01-17 NOTE — DISCHARGE NOTE ADULT - PLAN OF CARE
Follow up WOUND CARE: Penrose drain will be removed at follow-up visit.  Please keep dressing clean until follow up visit.  Use compression to keep dressing intact (mesh underwear, scrotal support).   BATHING: Please do not submerge wound underwater. You may shower and/or sponge bathe.  ACTIVITY: No heavy lifting anything more than 10-15lbs or straining. Otherwise, you may return to your usual level of physical activity. If you are taking narcotic pain medication (such as Percocet), do NOT drive a car, operate machinery or make important decisions.  DIET: Regular diet  NOTIFY YOUR SURGEON IF: You have any bleeding that does not stop, any pus draining from your wound, any fever (over 100.4 F) or chills, persistent nausea/vomiting with inability to tolerate food or liquids, persistent diarrhea, or if your pain is not controlled on your discharge pain medications.  FOLLOW-UP:  1. Please call to make a follow-up with Dr. Mercer in the office for a wound check and drain removal.   2. Please follow up with your primary care physician in one week regarding your hospitalization.  3. Please take antibiotics for one week.

## 2019-01-18 ENCOUNTER — APPOINTMENT (OUTPATIENT)
Dept: UROLOGY | Facility: CLINIC | Age: 39
End: 2019-01-18
Payer: COMMERCIAL

## 2019-01-18 ENCOUNTER — OTHER (OUTPATIENT)
Age: 39
End: 2019-01-18

## 2019-01-18 VITALS
HEIGHT: 71 IN | WEIGHT: 290 LBS | HEART RATE: 81 BPM | BODY MASS INDEX: 40.6 KG/M2 | SYSTOLIC BLOOD PRESSURE: 138 MMHG | TEMPERATURE: 98.2 F | DIASTOLIC BLOOD PRESSURE: 81 MMHG

## 2019-01-18 PROCEDURE — 99024 POSTOP FOLLOW-UP VISIT: CPT

## 2019-01-22 LAB — SPECIMEN SOURCE: SIGNIFICANT CHANGE UP

## 2019-01-24 LAB
-  AMPICILLIN: SIGNIFICANT CHANGE UP
-  CIPROFLOXACIN: SIGNIFICANT CHANGE UP
-  TETRACYCLINE: SIGNIFICANT CHANGE UP
-  VANCOMYCIN: SIGNIFICANT CHANGE UP
CULTURE RESULTS: SIGNIFICANT CHANGE UP
GRAM STN SPEC: SIGNIFICANT CHANGE UP
METHOD TYPE: SIGNIFICANT CHANGE UP
ORGANISM # SPEC MICROSCOPIC CNT: SIGNIFICANT CHANGE UP
ORGANISM # SPEC MICROSCOPIC CNT: SIGNIFICANT CHANGE UP

## 2019-01-25 ENCOUNTER — APPOINTMENT (OUTPATIENT)
Dept: UROLOGY | Facility: CLINIC | Age: 39
End: 2019-01-25
Payer: COMMERCIAL

## 2019-01-25 PROCEDURE — 99024 POSTOP FOLLOW-UP VISIT: CPT

## 2019-02-04 ENCOUNTER — APPOINTMENT (OUTPATIENT)
Dept: UROLOGY | Facility: CLINIC | Age: 39
End: 2019-02-04
Payer: COMMERCIAL

## 2019-02-04 PROCEDURE — 99024 POSTOP FOLLOW-UP VISIT: CPT

## 2019-02-15 LAB — FUNGUS SPEC QL CULT: SIGNIFICANT CHANGE UP

## 2019-03-07 ENCOUNTER — MOBILE ON CALL (OUTPATIENT)
Age: 39
End: 2019-03-07

## 2019-03-11 ENCOUNTER — APPOINTMENT (OUTPATIENT)
Dept: UROLOGY | Facility: CLINIC | Age: 39
End: 2019-03-11
Payer: COMMERCIAL

## 2019-03-11 DIAGNOSIS — N45.3 EPIDIDYMO-ORCHITIS: ICD-10-CM

## 2019-03-11 PROCEDURE — 99024 POSTOP FOLLOW-UP VISIT: CPT

## 2019-03-11 NOTE — ASSESSMENT
[FreeTextEntry1] : 39 year old male s/p 2 months from right simple orchiectomy for abscess, epididymoorchitis. Doing well. He has healed well and incisions looks well. He is back to his daily activities and work. He is not interested in fertility after delivery of his 3rd child. Wife is doing well with pregnancy. He will see Dr Tucker if there are any concerns regarding testosterone and energy. Currently he feels well, energy and libido is normal for him.

## 2019-03-11 NOTE — HISTORY OF PRESENT ILLNESS
[FreeTextEntry1] : 40 yo gentleman 2 months s/p right orchiectomy, here today for wound check. He has completed his abx. Wound cultures were negative, path was abscess and inflammation. Denies f/c/n/v. He reports occasional incisional pain. No wound drainage. He is back to work and daily activities. \par \par He has 2 children and another on it's way, he does not plan on future children.\par \par

## 2023-05-08 ENCOUNTER — APPOINTMENT (OUTPATIENT)
Dept: ORTHOPEDIC SURGERY | Facility: CLINIC | Age: 43
End: 2023-05-08

## 2023-05-11 ENCOUNTER — APPOINTMENT (OUTPATIENT)
Dept: ORTHOPEDIC SURGERY | Facility: CLINIC | Age: 43
End: 2023-05-11

## 2023-06-01 NOTE — BRIEF OPERATIVE NOTE - POST-OP DX
none Scrotal abscess  01/17/2019    Active  Maxx Gustafson  Testicular infarct, right  01/17/2019    Active  Maxx Gustafson 26

## 2024-02-16 NOTE — PATIENT PROFILE ADULT - OVER THE PAST TWO WEEKS HAVE YOU FELT DOWN, DEPRESSED OR HOPELESS?
Tolerates Diet Consistency Well  Tolerates Fluid Consistency Well  No Chewing/Swallowing Difficulties (Per Patient) 
no

## 2024-03-11 NOTE — DISCHARGE NOTE ADULT - HAS THE PATIENT RECEIVED THE INFLUENZA VACCINE THIS SEASON?
[FreeTextEntry1] : PVR shows MARICEL of 1.07 on right and 1.12 on the left, venous study reveals mild reflux in lesser saphenous vein,
no...

## 2025-04-20 NOTE — REASON FOR VISIT
Goal Outcome Evaluation:              A&Ox4. Forgetful. Speech is clear and spontaneous. Sinus Tach on the monitor. Tylenol given for pain x2 per patient request this shift, see MAR. Phosphorus replaced. Complaints of nausea and vomiting treated x1, see MAR. Patient receiving scheduled lactulose. No complaints at this time. Call light in reach. Family at bedside.                              [Follow-up Visit ___] : a follow-up visit  for [unfilled]